# Patient Record
Sex: MALE | Race: WHITE | NOT HISPANIC OR LATINO | Employment: FULL TIME | URBAN - METROPOLITAN AREA
[De-identification: names, ages, dates, MRNs, and addresses within clinical notes are randomized per-mention and may not be internally consistent; named-entity substitution may affect disease eponyms.]

---

## 2018-01-18 ENCOUNTER — COMMUNICATION - HEALTHEAST (OUTPATIENT)
Dept: ADMINISTRATIVE | Facility: CLINIC | Age: 53
End: 2018-01-18

## 2018-03-02 ENCOUNTER — AMBULATORY - HEALTHEAST (OUTPATIENT)
Dept: CARDIOLOGY | Facility: CLINIC | Age: 53
End: 2018-03-02

## 2018-03-02 ENCOUNTER — RECORDS - HEALTHEAST (OUTPATIENT)
Dept: ADMINISTRATIVE | Facility: OTHER | Age: 53
End: 2018-03-02

## 2018-03-06 ENCOUNTER — COMMUNICATION - HEALTHEAST (OUTPATIENT)
Dept: TELEHEALTH | Facility: CLINIC | Age: 53
End: 2018-03-06

## 2018-03-07 ENCOUNTER — OFFICE VISIT - HEALTHEAST (OUTPATIENT)
Dept: CARDIOLOGY | Facility: TELEHEALTH | Age: 53
End: 2018-03-07

## 2018-03-07 DIAGNOSIS — I48.0 PAROXYSMAL ATRIAL FIBRILLATION (H): ICD-10-CM

## 2018-03-07 ASSESSMENT — MIFFLIN-ST. JEOR: SCORE: 1758.44

## 2019-02-13 ENCOUNTER — COMMUNICATION - HEALTHEAST (OUTPATIENT)
Dept: ADMINISTRATIVE | Facility: CLINIC | Age: 54
End: 2019-02-13

## 2019-05-10 ENCOUNTER — RECORDS - HEALTHEAST (OUTPATIENT)
Dept: ADMINISTRATIVE | Facility: OTHER | Age: 54
End: 2019-05-10

## 2019-05-10 ENCOUNTER — AMBULATORY - HEALTHEAST (OUTPATIENT)
Dept: CARDIOLOGY | Facility: CLINIC | Age: 54
End: 2019-05-10

## 2019-05-15 ENCOUNTER — OFFICE VISIT - HEALTHEAST (OUTPATIENT)
Dept: CARDIOLOGY | Facility: TELEHEALTH | Age: 54
End: 2019-05-15

## 2019-05-15 DIAGNOSIS — I48.0 PAROXYSMAL ATRIAL FIBRILLATION (H): ICD-10-CM

## 2019-05-15 ASSESSMENT — MIFFLIN-ST. JEOR: SCORE: 1794.73

## 2021-05-28 NOTE — PROGRESS NOTES
Today we discussed his atrial fibrillation and this year he has had more episodes.  Longest episode being 12 hours.  He sometimes takes an extra metoprolol.  He can navigate fine with atrial fibrillation but is quite annoying.  He continues to take an aspirin a day.    Chest is clear to auscultation cardiovascular exam there is no increased secondary to pressure nor is her hepatojugular reflux.  S1-S2 without murmur clicks or rubs.  No edema.    We talked today about the option of medical therapy versus continued waiting, versus pulmonary vein isolation.  I would favor pulmonary vein isolation procedure as he is in a statistical category that would be most successful.  He would like to give that some consideration will have him visit with our atrial fibrillation clinic in the near future.    Thank you for allowing us to participate in his care.

## 2021-05-31 VITALS — BODY MASS INDEX: 26.14 KG/M2 | HEIGHT: 72 IN | WEIGHT: 193 LBS

## 2021-06-02 VITALS — HEIGHT: 72 IN | BODY MASS INDEX: 27.22 KG/M2 | WEIGHT: 201 LBS

## 2021-06-18 NOTE — LETTER
Letter by Mark Anthony Murray MD at      Author: Mark Anthony Murray MD Service: -- Author Type: --    Filed:  Encounter Date: 2/13/2019 Status: (Other)       Vishal Cat  [address]  [place] [address]      February 13, 2019      Dear Vishal,    This letter is to remind you that you will be due for your follow up appointment with Dr. Mark Anthony Murray  . To help ensure you are in the best health possible, a regular follow-up with your cardiologist is essential.     Please call our Patient Scheduling Line at [phone] to schedule your appointment at your earliest convenience.  If you have recently scheduled an appointment, please disregard this letter.    We look forward to seeing you again. As always, we are available at the number  above for any questions or concerns you may have.      Sincerely,     The Physicians and Staff of [facility] [place] [facility]

## 2021-06-26 NOTE — PROGRESS NOTES
Progress Notes by Mark Anthony Murray MD at 3/7/2018  3:50 PM     Author: Mark Anthony Murray MD Service: -- Author Type: Physician    Filed: 3/7/2018  5:44 PM Encounter Date: 3/7/2018 Status: Signed    : Mark Anthony Murray MD (Physician)           Click to link to [facility] NOTE    Thank you, Dr. Doherty, for asking us to see Vishal Cat at the [facility].      Assessment/Recommendations   Patient with paroxysmal atrial fibrillation which is more frequent.  We talked about various strategies including increasing his beta-blocker.  We talked about antiarrhythmic therapy and we also discussed the possibility of pulmonary vein isolation which typically works better from a statistical standpoint in people who have paroxysmal atrial fibrillation.  I showed him a heart model and where the ablation would take place and the rationale for isolating the pulmonary veins.    He would prefer not to change his medications now.  He does take an aspirin a day and and his chads 2 vascular score is 0.  I did encourage him to seek more information regarding pulmonary vein isolation and we will set him up with an appointment with 1 of our electrophysiologist in the near future.  Would like to hear more about this.    I did warn him that a sleep study would be likely recommended if pulmonary vein isolation procedure was being contemplated and given the timing of his atrial fibrillation episodes would be quite reasonable.  I did not yet order the sleep study but we will see with the electrophysiologist says.    Thank you for allowing us to participate in his care.         History of Present Illness    Mr. Vishal Cat is a 52 y.o. male with a history of paroxysmal atrial fibrillation.  He was first diagnosed with atrial fibrillation and emergency department visit.  Since that time he has had very intermittent atrial fibrillation and when I saw him a year ago it had been quite  infrequent on low-dose beta-blocker.  This past year he has had more episodes.  He has had 4-5 episodes that have lasted more than 3 or 4 hours.  The longest episode lasts 6 hours.  He typically will get them when he awakens in the night at 3 or 4 in the morning but can get them at anytime during the day.  Some episodes can only last 5 minutes.  He feels wiped out when he has these episodes if they are prolonged he really has to rest.  He will sometimes take an extra metoprolol and this seems to help but not always very quickly.  He is asked his spouse about snoring and she denies that he snores.  He is not sleepy during the day.  He does not fall asleep or nap.  He feels rested when he wakes up in the morning.  He is never had a sleep study.    He denies chest discomfort, orthopnea, paroxysmal nocturnal dyspnea or peripheral edema.  He is not syncopal when he has this.  His atrial fibrillation is often accompanied by some GI distress.    .     Physical Examination Review of Systems   Vitals:    03/07/18 1548   BP: 132/72   Pulse: 80   Resp: 16     Body mass index is 26.18 kg/(m^2).  Wt Readings from Last 3 Encounters:   03/07/18 193 lb (87.5 kg)   09/28/16 199 lb (90.3 kg)   09/10/15 197 lb 9.6 oz (89.6 kg)     General Appearance:   Alert, cooperative and in no acute distress.   ENT/Mouth: Oral mucuos membranes pink and moist .      EYES:  No scleral icterus. No Xanthelasma.    Neck: JVP normal. No Hepatojugular reflux. Thyroid not visualized   Chest/Lungs:   Lungs are clear to auscultation, equal chest wall expansion    Cardiovascular:   S1, S2 without murmur ,clicks or rubs. Brachial, radial and posterior tibial pulses are intact and symetric. No carotid bruits noted   Abdomen:  Nontender. BS+.       Extremities: No cyanosis, clubbing or edema   Skin: no xanthelasma, warm.    Psych: Appropriate affect.   Neurologic: normal gait, normal  bilateral, no tremors        General: WNL  Eyes: WNL  Ears/Nose/Throat:  WNL  Lungs: WNL  Heart: Irregular Heartbeat  Stomach: WNL  Bladder: WNL  Muscle/Joints: WNL  Skin: WNL  Nervous System: WNL  Mental Health: WNL     Blood: WNL     Medical History  Surgical History Family History Social History   No past medical history on file. No past surgical history on file. No family history on file. Social History     Social History   ? Marital status:      Spouse name: N/A   ? Number of children: N/A   ? Years of education: N/A     Occupational History   ? Not on file.     Social History Main Topics   ? Smoking status: Current Every Day Smoker   ? Smokeless tobacco: Never Used   ? Alcohol use Not on file   ? Drug use: Not on file   ? Sexual activity: Not on file     Other Topics Concern   ? Not on file     Social History Narrative          Medications  Allergies   Current Outpatient Prescriptions   Medication Sig Dispense Refill   ? aspirin 325 MG tablet Take 325 mg by mouth daily.     ? calcium-vitamin D (CALCIUM-VITAMIN D) 500 mg(1,250mg) -200 unit per tablet Take 1 tablet by mouth daily.     ? Lactobacillus rhamnosus GG (CULTURELLE) 10-15 Billion cell capsule Take 1 capsule by mouth daily.     ? metoprolol tartrate (LOPRESSOR) 50 MG tablet Take 50 mg by mouth 2 (two) times a day.      ? psyllium (METAMUCIL) 3.4 gram packet Take 1 packet by mouth daily.       No current facility-administered medications for this visit.       No Known Allergies      Lab Results    Chemistry/lipid CBC Cardiac Enzymes/BNP/TSH/INR   No results found for: CHOL, HDL, LDLCALC, TRIG, CREATININE, BUN, K, NA, CL, CO2 No results found for: WBC, HGB, HCT, MCV, PLT No results found for: CKTOTAL, CKMB, CKMBINDEX, TROPONINI, BNP, TSH, INR

## 2021-12-13 ENCOUNTER — OFFICE VISIT (OUTPATIENT)
Dept: CARDIOLOGY | Facility: CLINIC | Age: 56
End: 2021-12-13
Payer: COMMERCIAL

## 2021-12-13 VITALS
HEIGHT: 72 IN | DIASTOLIC BLOOD PRESSURE: 74 MMHG | WEIGHT: 209 LBS | HEART RATE: 70 BPM | SYSTOLIC BLOOD PRESSURE: 130 MMHG | BODY MASS INDEX: 28.31 KG/M2 | RESPIRATION RATE: 16 BRPM

## 2021-12-13 DIAGNOSIS — R06.83 SNORING: ICD-10-CM

## 2021-12-13 DIAGNOSIS — I48.0 PAROXYSMAL ATRIAL FIBRILLATION (H): Primary | ICD-10-CM

## 2021-12-13 PROCEDURE — 99215 OFFICE O/P EST HI 40 MIN: CPT | Performed by: INTERNAL MEDICINE

## 2021-12-13 RX ORDER — EPINEPHRINE 0.3 MG/.3ML
INJECTION SUBCUTANEOUS PRN
COMMUNITY
Start: 2021-06-02

## 2021-12-13 ASSESSMENT — MIFFLIN-ST. JEOR: SCORE: 1826.02

## 2021-12-13 NOTE — LETTER
12/13/2021    [facility] [address]  [place] [address]    RE: Vishal Cat       Dear Colleague,     I had the pleasure of seeing Vishal Cat in the [facility].    [facility]  Cardiac Electrophysiology     Consultation Note: Akbar Ocampo MD    Primary Care: Johann Gomez      Thank you, Johann Gomez, for asking the [facility] team to see . Vishal Cat to evaluate treatment options for paroxysmal atrial fibrillation.    Assessment/Recommendations   Assessment:      Paroxysmal atrial fibrillation: The patient's symptoms date back to 2009.  Episodes are relatively infrequent and treated with beta-blocker.  More recently the patient's arrhythmia burden has significantly increased over the past year.  The underlying pathophysiology of the patient's condition as well as treatment options with both medical therapy and ablation were discussed in detail.  The patient is a candidate for mapping and ablation of his arrhythmia.  The patient prefers not to be on long-term medical therapy if possible.  The risks, benefits, expected outcome for ablation therapy were explained and the patient will consider this information further before rendering a decision.    Snoring: The patient does give a history of snoring including waking himself up.  The patient's episodes of atrial fibrillation are most frequently occurring in the early morning hours just as he awakens.  It would be my recommendation that the patient undergo formal sleep testing in order to exclude sleep apnea which may be a significant contributor to recurrence of his atrial fibrillation.    Plan:    Continue with aspirin therapy.    Continue with current beta-blocker therapy    Referral to the sleep clinic for consideration of formal sleep study and treatment if appropriate.    Follow-up in the arrhythmia clinic with the EP nurse practitioner in 8-10 weeks.    The patient be  "contacted by the EP nurse clinician later this week to determine if he wishes to consider moving forward with ablation and if so in what timeframe.                History of Present Illness/Subjective    Mr. Vishal Cat is a 56 year old male with history of symptomatic atrial fibrillation dating back to 2009.  The first episode was associated with severe symptoms and he thought he is having \"heart attack\".  Subsequently the patient has had episodes over the past many years, but in the past 12 months he has had >20 episodes with episodes duration up to 16 hours.  Most frequently these episodes occur (waking up in the morning.  He has a sensation of feeling \"warm\" in his chest with subsequent sensation of abnormal heart beating which is rapid and he feels \"exhausted\".  He has no chest pain or shortness of breath.  He can have some mild orthostatic lightheadedness but has not had any severe presyncope or syncope.  On questioning he notes that he does snore and occasionally awakens himself from the snore.  He has not been formally tested for obstructive sleep apnea.    ECG:   Personally reviewed.  Unable to review previous obtained EKG tracings and cardiac monitor from [email or web address].    ECHO:   Dated: Pending his decision about whether he wishes to move forward with ablation.    Other Studies:       @[email or web address]@       Physical Examination Review of Systems   /74 (BP Location: Left arm, Patient Position: Sitting, Cuff Size: Adult Large)   Pulse 70   Resp 16   Ht 1.829 m (6')   Wt 94.8 kg (209 lb)   BMI 28.35 kg/m    Body mass index is 28.35 kg/m .  Wt Readings from Last 3 Encounters:   12/13/21 94.8 kg (209 lb)   05/15/19 91.2 kg (201 lb)   03/07/18 87.5 kg (193 lb)     [unfilled]    General     Appearance:   The patient is alert oriented to person place and situation.    The patient is in no acute distress at the time of my evaluation.   HEENT:  Pupils are equal, round, and reactive to light.  " Conjunctiva and sclera are clear.  ENT: Oral mucosa is moist and without redness. No evident nasal discharge.   Neck: Without palpable thyroid or appreciable lymph nodes.   Chest: Symmetric, without deformity.   Lungs:   Clear bilaterally with no rales, rhonchi, or wheezes.     Cardiovascular:   Rhythm is regular. S1 and S2 are normal. No significant murmur is present. JVP is normal. Lower extremities demonstrate no significant edema. Distal pulses are intact bilaterally.   Abdomen:  Soft, non-tender, and without hepatosplenomegaly. Bowel sounds present.   Extremities: Without deformity.   Skin: Skin is warm, dry, and otherwise intact.   Neurologic: Gait is normal.           A 12 point comprehensive review of systems was  negative except as noted.      Medical History  Surgical History Family History Social History   Past Medical History:   Diagnosis Date     Snoring 12/13/2021    Sleep study recommended 12/2021    History reviewed. No pertinent surgical history.   No family history of atrial fibrillation. Social History     Socioeconomic History     Marital status:      Spouse name: Not on file     Number of children: Not on file     Years of education: Not on file     Highest education level: Not on file   Occupational History     Not on file   Tobacco Use     Smoking status: Current Every Day Smoker     Packs/day: 1.00     Types: Cigarettes     Smokeless tobacco: Never Used   Substance and Sexual Activity     Alcohol use: Not on file     Drug use: Not on file     Sexual activity: Not on file   Other Topics Concern     Not on file   Social History Narrative     Not on file     Social Determinants of Health     Financial Resource Strain: Not on file   Food Insecurity: Not on file   Transportation Needs: Not on file   Physical Activity: Not on file   Stress: Not on file   Social Connections: Not on file   Intimate Partner Violence: Not on file   Housing Stability: Not on file          Medications  Allergies    Scheduled Meds:  Current Outpatient Medications   Medication Sig Dispense Refill     aspirin 325 MG tablet [ASPIRIN 325 MG TABLET] Take 325 mg by mouth daily.       calcium-vitamin D (CALCIUM-VITAMIN D) 500 mg(1,250mg) -200 unit per tablet [CALCIUM-VITAMIN D (CALCIUM-VITAMIN D) 500 MG(1,250MG) -200 UNIT PER TABLET] Take 1 tablet by mouth daily.       EPINEPHrine (ANY BX GENERIC EQUIV) 0.3 MG/0.3ML injection 2-pack as needed       Lactobacillus rhamnosus GG (CULTURELLE) 10-15 Billion cell capsule [LACTOBACILLUS RHAMNOSUS GG (CULTURELLE) 10-15 BILLION CELL CAPSULE] Take 1 capsule by mouth daily.       metoprolol tartrate (LOPRESSOR) 50 MG tablet [METOPROLOL TARTRATE (LOPRESSOR) 50 MG TABLET] Take 50 mg by mouth 2 (two) times a day.        Omega-3 Fatty Acids (FISH OIL PO) Take 1 capsule by mouth daily       psyllium (METAMUCIL) 3.4 gram packet [PSYLLIUM (METAMUCIL) 3.4 GRAM PACKET] Take 1 packet by mouth daily.      Allergies   Allergen Reactions     Bee Venom          Lab Results    Chemistry/lipid CBC Cardiac Enzymes/BNP/TSH/INR   No results found for: CHOL, HDL, TRIG, CREATININE, BUN, NA, CO2 No results found for: WBC, HGB, HCT, MCV, PLT No results found for: CKTOTAL, CKMB, TROPONINI, BNP, TSH, INR      Akbar Ocampo MD  Clinical Cardiac Electrophysiologist  [facility]      Thank you for allowing me to participate in the care of your patient.      Sincerely,     Akbar Ocampo MD     [facility]

## 2021-12-13 NOTE — Clinical Note
Hi Team, Please see my consult note. Can I ask 1 of you to please reach out to the patient at the end of this week or beginning of next week to see if he wishes to move forward with ablation.  I suspect he will but likely would prefer April or May. If the patient does want to move forward please have him get a transthoracic echo for cardiac function and valve status. I did talk to the patient about either going ahead with me or Dr. Aviles.   Thanks Akbar

## 2021-12-13 NOTE — PROGRESS NOTES
[facility]  Cardiac Electrophysiology     Consultation Note: Akbar Ocampo MD    Primary Care: Dionisio Gomez      Thank you, Dionisio Gomez, for asking the [facility] Care team to see Mr. Vishal Cta to evaluate treatment options for paroxysmal atrial fibrillation.    Assessment/Recommendations   Assessment:      Paroxysmal atrial fibrillation: The patient's symptoms date back to 2009.  Episodes are relatively infrequent and treated with beta-blocker.  More recently the patient's arrhythmia burden has significantly increased over the past year.  The underlying pathophysiology of the patient's condition as well as treatment options with both medical therapy and ablation were discussed in detail.  The patient is a candidate for mapping and ablation of his arrhythmia.  The patient prefers not to be on long-term medical therapy if possible.  The risks, benefits, expected outcome for ablation therapy were explained and the patient will consider this information further before rendering a decision.    Snoring: The patient does give a history of snoring including waking himself up.  The patient's episodes of atrial fibrillation are most frequently occurring in the early morning hours just as he awakens.  It would be my recommendation that the patient undergo formal sleep testing in order to exclude sleep apnea which may be a significant contributor to recurrence of his atrial fibrillation.    Plan:    Continue with aspirin therapy.    Continue with current beta-blocker therapy    Referral to the sleep clinic for consideration of formal sleep study and treatment if appropriate.    Follow-up in the arrhythmia clinic with the EP nurse practitioner in 8-10 weeks.    The patient be contacted by the EP nurse clinician later this week to determine if he wishes to consider moving forward with ablation and if so in what timeframe.                History of Present Illness/Subjective    Mr. Vishal HAGER  "Stephania is a 56 year old male with history of symptomatic atrial fibrillation dating back to 2009.  The first episode was associated with severe symptoms and he thought he is having \"heart attack\".  Subsequently the patient has had episodes over the past many years, but in the past 12 months he has had >20 episodes with episodes duration up to 16 hours.  Most frequently these episodes occur (waking up in the morning.  He has a sensation of feeling \"warm\" in his chest with subsequent sensation of abnormal heart beating which is rapid and he feels \"exhausted\".  He has no chest pain or shortness of breath.  He can have some mild orthostatic lightheadedness but has not had any severe presyncope or syncope.  On questioning he notes that he does snore and occasionally awakens himself from the snore.  He has not been formally tested for obstructive sleep apnea.    ECG:   Personally reviewed.  Unable to review previous obtained EKG tracings and cardiac monitor from [employer].    ECHO:   Dated: Pending his decision about whether he wishes to move forward with ablation.    Other Studies:       @[email or web address]@       Physical Examination Review of Systems   /74 (BP Location: Left arm, Patient Position: Sitting, Cuff Size: Adult Large)   Pulse 70   Resp 16   Ht 1.829 m (6')   Wt 94.8 kg (209 lb)   BMI 28.35 kg/m    Body mass index is 28.35 kg/m .  Wt Readings from Last 3 Encounters:   12/13/21 94.8 kg (209 lb)   05/15/19 91.2 kg (201 lb)   03/07/18 87.5 kg (193 lb)     [unfilled]    General     Appearance:   The patient is alert oriented to person place and situation.    The patient is in no acute distress at the time of my evaluation.   HEENT:  Pupils are equal, round, and reactive to light.  Conjunctiva and sclera are clear.  ENT: Oral mucosa is moist and without redness. No evident nasal discharge.   Neck: Without palpable thyroid or appreciable lymph nodes.   Chest: Symmetric, without deformity. "   Lungs:   Clear bilaterally with no rales, rhonchi, or wheezes.     Cardiovascular:   Rhythm is regular. S1 and S2 are normal. No significant murmur is present. JVP is normal. Lower extremities demonstrate no significant edema. Distal pulses are intact bilaterally.   Abdomen:  Soft, non-tender, and without hepatosplenomegaly. Bowel sounds present.   Extremities: Without deformity.   Skin: Skin is warm, dry, and otherwise intact.   Neurologic: Gait is normal.           A 12 point comprehensive review of systems was  negative except as noted.      Medical History  Surgical History Family History Social History   Past Medical History:   Diagnosis Date     Snoring 12/13/2021    Sleep study recommended 12/2021    History reviewed. No pertinent surgical history.   No family history of atrial fibrillation. Social History     Socioeconomic History     Marital status:      Spouse name: Not on file     Number of children: Not on file     Years of education: Not on file     Highest education level: Not on file   Occupational History     Not on file   Tobacco Use     Smoking status: Current Every Day Smoker     Packs/day: 1.00     Types: Cigarettes     Smokeless tobacco: Never Used   Substance and Sexual Activity     Alcohol use: Not on file     Drug use: Not on file     Sexual activity: Not on file   Other Topics Concern     Not on file   Social History Narrative     Not on file     Social Determinants of Health     Financial Resource Strain: Not on file   Food Insecurity: Not on file   Transportation Needs: Not on file   Physical Activity: Not on file   Stress: Not on file   Social Connections: Not on file   Intimate Partner Violence: Not on file   Housing Stability: Not on file          Medications  Allergies   Scheduled Meds:  Current Outpatient Medications   Medication Sig Dispense Refill     aspirin 325 MG tablet [ASPIRIN 325 MG TABLET] Take 325 mg by mouth daily.       calcium-vitamin D (CALCIUM-VITAMIN D) 500  mg(1,250mg) -200 unit per tablet [CALCIUM-VITAMIN D (CALCIUM-VITAMIN D) 500 MG(1,250MG) -200 UNIT PER TABLET] Take 1 tablet by mouth daily.       EPINEPHrine (ANY BX GENERIC EQUIV) 0.3 MG/0.3ML injection 2-pack as needed       Lactobacillus rhamnosus GG (CULTURELLE) 10-15 Billion cell capsule [LACTOBACILLUS RHAMNOSUS GG (CULTURELLE) 10-15 BILLION CELL CAPSULE] Take 1 capsule by mouth daily.       metoprolol tartrate (LOPRESSOR) 50 MG tablet [METOPROLOL TARTRATE (LOPRESSOR) 50 MG TABLET] Take 50 mg by mouth 2 (two) times a day.        Omega-3 Fatty Acids (FISH OIL PO) Take 1 capsule by mouth daily       psyllium (METAMUCIL) 3.4 gram packet [PSYLLIUM (METAMUCIL) 3.4 GRAM PACKET] Take 1 packet by mouth daily.      Allergies   Allergen Reactions     Bee Venom          Lab Results    Chemistry/lipid CBC Cardiac Enzymes/BNP/TSH/INR   No results found for: CHOL, HDL, TRIG, CREATININE, BUN, NA, CO2 No results found for: WBC, HGB, HCT, MCV, PLT No results found for: CKTOTAL, CKMB, TROPONINI, BNP, TSH, INR      Akbar Ocampo MD  Clinical Cardiac Electrophysiologist  [facility]

## 2021-12-14 ENCOUNTER — TELEPHONE (OUTPATIENT)
Dept: CARDIOLOGY | Facility: CLINIC | Age: 56
End: 2021-12-14
Payer: COMMERCIAL

## 2021-12-14 NOTE — TELEPHONE ENCOUNTER
Noted.  PC note created and postponed to 12/17 to reach out to pt per request by Dr Ocampo.  12/14/2021 8:22 AM  TRACEE Arango Stuart, MD  [facility] Ep Support [facility] Team,  Please see my consult note.  Can I ask 1 of you to please reach out to the patient at the end of this week or beginning of next week to see if he wishes to move forward with ablation.  I suspect he will but likely would prefer April or May.  If the patient does want to move forward please have him get a transthoracic echo for cardiac function and valve status.  I did talk to the patient about either going ahead with me or Dr. Aviles.    Thanks  Akbar

## 2021-12-20 DIAGNOSIS — I48.0 PAROXYSMAL ATRIAL FIBRILLATION (H): Primary | ICD-10-CM

## 2021-12-20 NOTE — TELEPHONE ENCOUNTER
Dr. Ocampo,     Patient was called, agrees to PVI.  Wants the summer of 2022.  Reviewed need for echo, patient agreeable, echo ordered.      Will plan for PVI, general anesthesia.  Start eliquis 1 month prior.  No pre procedure imaging (other than TTE)  No AADs to hold    Patient prefers to have procedure with you.    Anything additional?  Thank you  Kena

## 2021-12-28 ENCOUNTER — DOCUMENTATION ONLY (OUTPATIENT)
Dept: CARDIOLOGY | Facility: CLINIC | Age: 56
End: 2021-12-28
Payer: COMMERCIAL

## 2021-12-28 NOTE — TELEPHONE ENCOUNTER
Akbar Ocampo MD  You 21 hours ago (11:09 AM)     SA Escudero,   Agree with the plan as you outlined.  Akbar Ocampo MD

## 2021-12-28 NOTE — PROGRESS NOTES
H&P []  Date: Teach []  Date: PVI  Clinic N [x]  Y [] DEDE N [x] Y[]  Order [] CT  MRI N [x] Y[]  Order []   PVI  Order Case Req []  Order Set [] Lab/EKG   []  Orders Letter [] F/U RN [] Date:  NP [] Date:     COVID FV/EPIC []  External []  Date: AC Eliquis [x]   Xarelot []  Pradaxa []  Warfarin [] Start [x]  Cont []  Switch [] to:   INR Reminder EP [] & INR Msg to AC team []     AAD         Hold x3 days []  Continue [] PPI Protonix 40mg QD [x] 3 days, x 6wks  Pepcid 20mg BID []  Omeprazole 40mg QD []  Continue current PPI []  Increase [] :        NEEDS TTE, ordered    1965  Home:[phone] (home) Cell:[phone] (mobile)  Emergency Contact: KRISTY COONEY  [phone]  PCP: Dionisio Doherty, [phone]    Important patient information for CSC/Cath Lab staff : None    C EP Cath Lab Procedure Order   Ablation Type:  PVI- Atrial Fibrillation  Diagnosis:  AF  Ordering Provider: Dr Ocampo  Ordering Date: 12/28/2021    Scheduling Information:  Anticipated Case Duration:  1:1 day- limit schedule to allow no further procedures to follow ablation on scheduled date   Scheduling Timeframe:  patient looking for spring/summer  Clinic Arrangements prior to PVI: Schedule pt directly for PVI procedure with designated MD listed below, pt to see EP NP only for H&P and EP RN for education prior  Scheduling Restrictions: Will need to start anticoagulant 4 wks prior- schedule accordingly  Scheduling Contact: Pt is aware of scheduling limitations at this time due to schedule, please call pt when schedule is available  EP RN Follow Up Apt: Schedule telephone visit for post op follow up 3-4 days after abaltion    MD Preference: Dr Ocampo    Current Device: None None  Device Company/Device Rep Needed for Procedure: None    Pre-Procedural Testing needed: COVID 19 nasal/lab test, Echo and BMP, CBC with Plt, and Type and Screen AM of Case  Mapping System Required:  Can use either NILESH (OR 24 only at [facility]) or Carto (Rm 1 [facility], or  [facility])  ICE Needed:  Yes  Special equipment/Staff needed for case: None  Anesthesia:  General-Whole Case    [facility] EP Cath Lab Prep   H&P:  Schedule apt with EP NP to complete within 1 wk of scheduled PVI    Pre-op Labs: Ordered AM of procedure    Medical Records Pertinent for Procedure:  Echo ordered  Important Past Medical Hx/Diagnosis: CHADS VASC 0   Most Recent Lab Results: BMP- Within Acceptable Limits for Procedure Heme- Within Acceptable Limits for Procedure    Patient Education:  Teach with Patient: Teach with pt will be completed same day as pre-op H&P-see additional clinic note    Risks Reviewed:   Pulmonary Vein/AF/Radiofrequency Ablation  In addition to standard risks for Radiofrequency Ablation, there is:    <2% for significant pulmonary vein stenosis    <2% risk for embolic events    <1% risk for esophageal fistula    <1% risk death      Pulmonary Vein Isolation / Cryoablation Risks:    1-2% risk for phrenic nerve paralysis    <1% risk for pulmonary vein stenosis    Risk of esophageal irritation with no incidence of atrial-esophageal fistula    Rare cryoballoon rupture    <1% risk death       Cardiac Catheter Ablation    <1% risk for the following: hypotension, hemorrhage, vascular injury including perforation of vein, artery or heart, thrombophlebitis, systemic or pulmonic emboli; cardiac perforation, (tamponade), infection, pneumothorax, arrhythmias, proarrhythmic effects of drugs, radiation exposure.    1-2% complete heart block (for AVNRT or septol accessory pathway).    <0.5% CVA or MI    <0.1% death    If external defibrillation is needed, 75% risk for superficial burn.    1-2% tamponade and aortic puncture with left sided transeptal approach for left side NILESH - increase risk of CVA to <2%.    Late arrhythmia recurrences depends upon the primary rhythm disturbance.      Pre-Procedure Instructions:  NPO after midnight, Remove all jewelry prior to coming in for procedure, Shower prior to arrival,  Notified patient of time and date of procedure by CV , Transportation arrangements needed s/p procedure, Post-procedure follow up process, Sedation plan/orders and Pre-procedure letter was sent to pt    Pre-Procedure Medication Instructions:  Instructions given to pt regarding anticoagulants: Pt will be started on anticoagulant 1mo prior to PVI- instructed to continue anticoagulation uninterrupted through their procedure  Instructions given to pt regarding antiarrhythmic medication: Beta Blocker; Pt instructed to continue medication prior to procedure  Instructions given to pt regarding PPI medication:Start Protonix 40mg Daily 3 days prior, and will continue 6 wks s/p PVI  Instructions for medication, other than anticoagulants and antiarrhythmics listed above, given to pt: to hold all morning medications   Allergies: Reviewed allergies, no concerns regarding orders for procedure  Allergies   Allergen Reactions     Bee Venom        Current Outpatient Medications:      aspirin 325 MG tablet, [ASPIRIN 325 MG TABLET] Take 325 mg by mouth daily., Disp: , Rfl:      calcium-vitamin D (CALCIUM-VITAMIN D) 500 mg(1,250mg) -200 unit per tablet, [CALCIUM-VITAMIN D (CALCIUM-VITAMIN D) 500 MG(1,250MG) -200 UNIT PER TABLET] Take 1 tablet by mouth daily., Disp: , Rfl:      EPINEPHrine (ANY BX GENERIC EQUIV) 0.3 MG/0.3ML injection 2-pack, as needed, Disp: , Rfl:      Lactobacillus rhamnosus GG (CULTURELLE) 10-15 Billion cell capsule, [LACTOBACILLUS RHAMNOSUS GG (CULTURELLE) 10-15 BILLION CELL CAPSULE] Take 1 capsule by mouth daily., Disp: , Rfl:      metoprolol tartrate (LOPRESSOR) 50 MG tablet, [METOPROLOL TARTRATE (LOPRESSOR) 50 MG TABLET] Take 50 mg by mouth 2 (two) times a day. , Disp: , Rfl:      Omega-3 Fatty Acids (FISH OIL PO), Take 1 capsule by mouth daily, Disp: , Rfl:      psyllium (METAMUCIL) 3.4 gram packet, [PSYLLIUM (METAMUCIL) 3.4 GRAM PACKET] Take 1 packet by mouth daily., Disp: , Rfl:     Documentation  Date:12/28/2021 8:07 AM  Nevaeh Shea RN

## 2022-02-22 ENCOUNTER — TELEPHONE (OUTPATIENT)
Dept: CARDIOLOGY | Facility: CLINIC | Age: 57
End: 2022-02-22
Payer: COMMERCIAL

## 2022-02-22 NOTE — TELEPHONE ENCOUNTER
Phone call from patient stating he was sent home after an attempt at an Echo on 1-20 due to high heart rates, he wonders if it is ok to reschedule?    Pt states that he is having an increase in afib but is not noting fast heart rates.  He would like to reschedule.  Explained that he can reschedule at any time as he is doing well.  He states understanding, suggested a follow-up with an EP NP if he does note continuing high heart rates.  He states understanding, will ask our scheduling team to call him.

## 2022-02-28 ENCOUNTER — HOSPITAL ENCOUNTER (OUTPATIENT)
Dept: CARDIOLOGY | Facility: CLINIC | Age: 57
Discharge: HOME | End: 2022-02-28
Attending: INTERNAL MEDICINE | Admitting: INTERNAL MEDICINE
Payer: COMMERCIAL

## 2022-02-28 PROCEDURE — 93306 TTE W/DOPPLER COMPLETE: CPT

## 2022-02-28 PROCEDURE — 93306 TTE W/DOPPLER COMPLETE: CPT | Mod: 26 | Performed by: INTERNAL MEDICINE

## 2022-03-02 DIAGNOSIS — I48.92 ATRIAL FLUTTER (H): Primary | ICD-10-CM

## 2022-03-04 ENCOUNTER — HOSPITAL ENCOUNTER (OUTPATIENT)
Dept: CARDIOLOGY | Facility: CLINIC | Age: 57
Discharge: HOME | End: 2022-03-04
Attending: INTERNAL MEDICINE | Admitting: INTERNAL MEDICINE
Payer: COMMERCIAL

## 2022-03-04 DIAGNOSIS — I48.92 ATRIAL FLUTTER (H): ICD-10-CM

## 2022-03-04 PROCEDURE — 93226 XTRNL ECG REC<48 HR SCAN A/R: CPT

## 2022-03-09 ENCOUNTER — OFFICE VISIT (OUTPATIENT)
Dept: SLEEP MEDICINE | Facility: CLINIC | Age: 57
End: 2022-03-09
Attending: INTERNAL MEDICINE
Payer: COMMERCIAL

## 2022-03-09 VITALS
BODY MASS INDEX: 27.5 KG/M2 | DIASTOLIC BLOOD PRESSURE: 79 MMHG | HEART RATE: 72 BPM | SYSTOLIC BLOOD PRESSURE: 117 MMHG | WEIGHT: 203 LBS | HEIGHT: 72 IN | OXYGEN SATURATION: 96 %

## 2022-03-09 DIAGNOSIS — I48.0 PAROXYSMAL ATRIAL FIBRILLATION (H): Primary | ICD-10-CM

## 2022-03-09 DIAGNOSIS — R06.83 SNORING: ICD-10-CM

## 2022-03-09 DIAGNOSIS — F51.04 CHRONIC INSOMNIA: ICD-10-CM

## 2022-03-09 PROCEDURE — 99205 OFFICE O/P NEW HI 60 MIN: CPT | Performed by: PHYSICIAN ASSISTANT

## 2022-03-09 PROCEDURE — 93227 XTRNL ECG REC<48 HR R&I: CPT | Performed by: INTERNAL MEDICINE

## 2022-03-09 NOTE — PATIENT INSTRUCTIONS
General recommendations for sleep problems (Insomnia)  Allow 2-4 weeks to see results     Establish a regular sleep schedule    Most people only need 7-8 hours of sleep.  Don't be in bed longer than you need     to sleep or you will end up spending more time awake in bed. This trains your    brain to think of the bed as a place to not sleep.  Go to bed at same time each night   Get up at same time each day - Set an alarm everyday (even weekends). This is one of    the most important tips. It prevents you from relying on your insomnia to get you    up on time for your day. That actually reinforces insomnia. It also will help your    body get into a pattern where you start feeling tired at a consistent time each    night.  The body functions best when you keep a consistent routine.  Avoid sleeping-in and napping. Anytime you sleep during the day, you will be less tired at    night. You may be tired enough to fall asleep, but you will wake more in the    middle of the night because you will have met your sleep need before the night is    done.   Cut down time in bed (if not asleep, get up)- Use your bed only for sleep and sex    Anytime you spend time in bed doing activities other than sleep (reading,    watching TV, working, playing on the computer or phone, or even just laying in    bed trying to sleep), you are training  your brain to think of the bed as a place to    do activities other than sleep. If you are not falling asleep within 20-30 minutes,    get out of bed. While out of bed, avoid bright lights. Avoid work or chores. Being    productive in the middle of the night reinforces waking up at night. Find relaxing,    not particularly entertaining activities like reading, listening to music, or relaxation    exercises. Go back to bed if you start feeling groggy, or after about 30 minutes,    even if not feeling very tired. Sometimes, just getting out of bed stops the pattern    of getting frustrated about  laying in bed not sleeping, and that can help you fall    asleep.   Avoid trying to force yourself to sleep- sleep is not like everything else. The harder you    work at most things, the more you can accomplish. The harder you work at    sleep, the less you will sleep.     Make the bedroom comfortable - quiet, dark and cool are better. Consider ear plugs    (silicon). Use dark blinds or wear an eye mask if needed     Make a relaxing routine prior to bedtime  Relaxation exercises:   Progressive muscle relaxation: Relax each muscle group individually    Begin with your feet, flex, then relax. Try to imagine your feet feeling heavy and sinking into the bed. Move to your calves, do the same thing. Work through each muscle group toward your head.    Relaxing Mental Imagery: Try to imagine a trip that you took and found relaxing, or imagine a day at the beach. Try to walk yourself through the day in your mind as if you were dreaming it. Try to imagine sensing the different experiences, such as feeling sand between your toes, the heat of the sun on your skin, seeing the waves crashing the shore, the smell of the salt water, etc.     Deal with your worries before bedtime    Set aside a worry time around dinner time for 10-15 minutes. Write down the    things that are on your mind. Plan time in the coming days to address those    issues. Brainstorm ideas on how you will deal with them. Try to identify issues    that are out of your control, and try to let those issues go.  Listen to relaxation tapes   Classical Music or Nature sounds   Back Massage   Get regular exercise each day (at least 1-2 hours before bedtime)   Take medications only as directed   Eat a light bedtime snack or warm drink   Warm milk   Warm herbal tea (non-caffeinated)       Things to avoid   No overstimulating activities just before bed   No competitive games before bedtime   No exciting television programs before bedtime   Avoid caffeine after lunchtime    Avoid chocolate   Do not use alcohol to induce sleep (worsens Insomnia)   Do not take someone else's sleeping pills   Do not look at the clock when awakening   Do not turn on light when getting up to use bathroom, use a nightlight     Online Programs     www.SHUTi.me (pronounced shut eye). There is a fee for this program. Enter the code  [facility]  if you decide to enroll in this program.      www.sleepIO.com (pronounced sleep ee oh). There is a fee for this program. Enter the code  [facility] [place]  if you decide to enroll in this program.     Suggested Resources  Insomnia Treatment Books     Overcoming Insomnia by Raudel Mason and Corinna Foster (2008)    No More Sleepless Nights by Mainor Block and Kemi Eastman (1996)    Say Keenan to Insomnia by Griffin Evans (2009)    The Insomnia Workbook by Jennie Fulton and Efrain Paulson (2009)    The Insomnia Answer by Hernando Decker and Domo Miller (2006)      Stress Management and Relaxation Books    The Relaxation and Stress Reduction Workbook by Jessica Ramos, Arleen Anderson and Krzysztof Whittington (2008)    Stress Management Workbook: Techniques and Self-Assessment Procedures by Amrita Metzger and Reed Wong (1997)    A Mindfulness-Based Stress Reduction Workbook by Fidel Sherman and Joseline Barnes (2010)    The Complete Stress Management Workbook by Mervin Fajardo, Trevon Barajas and Richard Camejo (1996)    Assert Yourself by Ольга Rojas and Kb Rojas (1977)    Relaxation Resources for Computer Download   These websites offer resources to help you relax. This list is for information only. [facility] is not responsible for the quality of services or the actions of any person or organization.  Progressive Muscle Relaxation (PMR):     http://www.innerGather Appstudio.com/progressive-muscle-relaxation-exercise.html     http://studentsupport.[facility]/counseling/resources/self-help/relaxation-and-stress-management/   Deep Breathing  "Exercises:    http://www.Therosteon/breathing-awareness.html     Meditation:     www.HW    www.[employer] ManagementguidedNano Game Studiomeditation-site."[employer]" You may have to pay for some of these resources.    Guided Imagery:    http://www.Therosteon/guided-imagery-scripts.html     http://[employer] V[employer]/jvvhtdlimd-lpoifo-tgeqjfl/   Consider phone apps such as: Calm, Headspace or Insight Timer.    Counseling / Behavioral Health  [facility]  Visit [email or web address] or call [phone] to find a clinic close to you.  Or call [phone] for [facility].          MY TREATMENT INFORMATION FOR SLEEP APNEA-  Vishal MADAN Cat    DOCTOR : Bennett Ezra Goltz, PA-C, [facility]    Am I having a sleep study at a sleep center?  --->Due to normal delays, you will be contacted within 2-4 weeks to schedule    Am I having a home sleep study?  --->Watch the video for the device you are using:    -/drop off device-   https://www.Amarinube.com/watch?v=yGGFBdELGhk    -Disposable device sent out require phone/computer application-   https://www.Amarinube.com/watch?v=BCce_vbiwxE      Frequently asked questions:  1. What is Obstructive Sleep Apnea (JANET)? JANET is the most common type of sleep apnea. Apnea means, \"without breath.\"  Apnea is most often caused by narrowing or collapse of the upper airway as muscles relax during sleep.   Almost everyone has occasional apneas. Most people with sleep apnea have had brief interruptions at night frequently for many years.  The severity of sleep apnea is related to how frequent and severe the events are.   2. What are the consequences of JANET? Symptoms include: feeling sleepy during the day, snoring loudly, gasping or stopping of breathing, trouble sleeping, and occasionally morning headaches or heartburn at night.  Sleepiness can be serious and even increase the risk of falling asleep while driving. Other health consequences may include " development of high blood pressure and other cardiovascular disease in persons who are susceptible. Untreated JANET  can contribute to heart disease, stroke and diabetes.   3. What are the treatment options? In most situations, sleep apnea is a lifelong disease that must be managed with daily therapy. Medications are not effective for sleep apnea and surgery is generally not considered until other therapies have been tried. Your treatment is your choice . Continuous Positive Airway (CPAP) works right away and is the therapy that is effective in nearly everyone. An oral device to hold your jaw forward is usually the next most reliable option. Other options include postioning devices (to keep you off your back), weight loss, and surgery including a tongue pacing device. There is more detail about some of these options below.  4. Are my sleep studies covered by insurance? Although we will request verification of coverage, we advise you also check in advance of the study to ensure there is coverage.    Important tips for those choosing CPAP and similar devices   Know your equipment:  CPAP is continuous positive airway pressure that prevents obstructive sleep apnea by keeping the throat from collapsing while you are sleeping. In most cases, the device is  smart  and can slowly self-adjusts if your throat collapses and keeps a record every day of how well you are treated-this information is available to you and your care team.  BPAP is bilevel positive airway pressure that keeps your throat open and also assists each breath with a pressure boost to maintain adequate breathing.  Special kinds of BPAP are used in patients who have inadequate breathing from lung or heart disease. In most cases, the device is  smart  and can slowly self-adjusts to assist breathing. Like CPAP, the device keeps a record of how well you are treated.  Your mask is your connection to the device. You get to choose what feels most comfortable and the  staff will help to make sure if fits. Here: are some examples of the different masks that are available:       Key points to remember on your journey with sleep apnea:  1. Sleep study.  PAP devices often need to be adjusted during a sleep study to show that they are effective and adjusted right.  2. Good tips to remember: Try wearing just the mask during a quiet time during the day so your body adapts to wearing it. A humidifier is recommended for comfort in most cases to prevent drying of your nose and throat. Allergy medication from your provider may help you if you are having nasal congestion.  3. Getting settled-in. It takes more than one night for most of us to get used to wearing a mask. Try wearing just the mask during a quiet time during the day so your body adapts to wearing it. A humidifier is recommended for comfort in most cases. Our team will work with you carefully on the first day and will be in contact within 4 days and again at 2 and 4 weeks for advice and remote device adjustments. Your therapy is evaluated by the device each day.   4. Use it every night. The more you are able to sleep naturally for 7-8 hours, the more likely you will have good sleep and to prevent health risks or symptoms from sleep apnea. Even if you use it 4 hours it helps. Occasionally all of us are unable to use a medical therapy, in sleep apnea, it is not dangerous to miss one night.   5. Communicate. Call our skilled team on the number provided on the first day if your visit for problems that make it difficult to wear the device. Over 2 out of 3 patients can learn to wear the device long-term with help from our team. Remember to call our team or your sleep providers if you are unable to wear the device as we may have other solutions for those who cannot adapt to mask CPAP therapy. It is recommended that you sleep your sleep provider within the first 3 months and yearly after that if you are not having problems.   6. Use it  for your health. We encourage use of CPAP masks during daytime quiet periods to allow your face and brain to adapt to the sensation of CPAP so that it will be a more natural sensation to awaken to at night or during naps. This can be very useful during the first few weeks or months of adapting to CPAP though it does not help medically to wear CPAP during wakefulness and  should not be used as a strategy just to meet guidelines.  7. Take care of your equipment. Make sure you clean your mask and tubing using directions every day and that your filter and mask are replaced as recommended or if they are not working.     BESIDES CPAP, WHAT OTHER THERAPIES ARE THERE?    Positioning Device  Positioning devices are generally used when sleep apnea is mild and only occurs on your back.This example shows a pillow that straps around the waist. It may be appropriate for those whose sleep study shows milder sleep apnea that occurs primarily when lying flat on one's back. Preliminary studies have shown benefit but effectiveness at home may need to be verified by a home sleep test. These devices are generally not covered by medical insurance.  Examples of devices that maintain sleeping on the back to prevent snoring and mild sleep apnea.    Belt type body positioner  http://GroupSpaces/    Electronic reminder  http://nightshifttherapy.com/  http://www.Wellpartner.AccessData.au/      Oral Appliance  What is oral appliance therapy?  An oral appliance device fits on your teeth at night like a retainer used after having braces. The device is made by a specialized dentist and requires several visits over 1-2 months before a manufactured device is made to fit your teeth and is adjusted to prevent your sleep apnea. Once an oral device is working properly, snoring should be improved. A home sleep test may be recommended at that time if to determine whether the sleep apnea is adequately treated.       Some things to remember:  -Oral devices are often,  but not always, covered by your medical insurance. Be sure to check with your insurance provider.   -If you are referred for oral therapy, you will be given a list of specialized dentists to consider or you may choose to visit the Web site of the American Academy of Dental Sleep Medicine  -Oral devices are less likely to work if you have severe sleep apnea or are extremely overweight.     More detailed information  An oral appliance is a small acrylic device that fits over the upper and lower teeth  (similar to a retainer or a mouth guard). This device slightly moves jaw forward, which moves the base of the tongue forward, opens the airway, improves breathing for effective treat snoring and obstructive sleep apnea in perhaps 7 out of 10 people .  The best working devices are custom-made by a dental device  after a mold is made of the teeth 1, 2, 3.  When is an oral appliance indicated?  Oral appliance therapy is recommended as a first-line treatment for patients with primary snoring, mild sleep apnea, and for patients with moderate sleep apnea who prefer appliance therapy to use of CPAP4, 5. Severity of sleep apnea is determined by sleep testing and is based on the number of respiratory events per hour of sleep.   How successful is oral appliance therapy?  The success rate of oral appliance therapy in patients with mild sleep apnea is 75-80% while in patients with moderate sleep apnea it is 50-70%. The chance of success in patients with severe sleep apnea is 40-50%. The research also shows that oral appliances have a beneficial effect on the cardiovascular health of JAENT patients at the same magnitude as CPAP therapy7.  Oral appliances should be a second-line treatment in cases of severe sleep apnea, but if not completely successful then a combination therapy utilizing CPAP plus oral appliance therapy may be effective. Oral appliances tend to be effective in a broad range of patients although studies show  that the patients who have the highest success are females, younger patients, those with milder disease, and less severe obesity. 3, 6.   Finding a dentist that practices dental sleep medicine  Specific training is available through the American Academy of Dental Sleep Medicine for dentists interested in working in the field of sleep. To find a dentist who is educated in the field of sleep and the use of oral appliances, near you, visit the Web site of the American Academy of Dental Sleep Medicine.    References  1. Silvana, et al. Objectively measured vs self-reported compliance during oral appliance therapy for sleep-disordered breathing. Chest 2013; 144(5): 6975-1710.  2. Eliseo, et al. Objective measurement of compliance during oral appliance therapy for sleep-disordered breathing. Thorax 2013; 68(1): 91-96.  3. Earl et al. Mandibular advancement devices in 620 men and women with JANET and snoring: tolerability and predictors of treatment success. Chest 2004; 125: 9967-5965.  4. Antonio, et al. Oral appliances for snoring and JANET: a review. Sleep 2006; 29: 244-262.  5. Natali et al. Oral appliance treatment for JANET: an update. J Clin Sleep Med 2014; 10(2): 215-227.  6. Nathalia et al. Predictors of OSAH treatment outcome. J Dent Res 2007; 86: 9304-4730.      Weight Loss:    Weight loss is a long-term strategy that may improve sleep apnea in some patients.    Weight management is a personal decision and the decision should be based on your interest and the potential benefits.  If you are interested in exploring weight loss strategies, the following discussion covers the impact on weight loss on sleep apnea and the approaches that may be successful.    Being overweight does not necessarily mean you will have health consequences.  Those who have BMI over 35 or over 27 with existing medical conditions carries greater risk.   Weight loss decreases severity of sleep apnea in most people with obesity.  For those with mild obesity who have developed snoring with weight gain, even 15-30 pound weight loss can improve and occasionally eliminate sleep apnea.  Structured and life-long dietary and health habits are necessary to lose weight and keep healthier weight levels.     Though there may be significant health benefits from weight loss, long-term weight loss is very difficult to achieve- studies show success with dietary management in less than 10% of people. In addition, substantial weight loss may require years of dietary control and may be difficult if patients have severe obesity. In these cases, surgical management may be considered.  Finally, older individuals who have tolerated obesity without health complications may be less likely to benefit from weight loss strategies.        Your BMI is Body mass index is 27.53 kg/m .  Weight management is a personal decision.  If you are interested in exploring weight loss strategies, the following discussion covers the approaches that may be successful. Body mass index (BMI) is one way to tell whether you are at a healthy weight, overweight, or obese. It measures your weight in relation to your height.  A BMI of 18.5 to 24.9 is in the healthy range. A person with a BMI of 25 to 29.9 is considered overweight, and someone with a BMI of 30 or greater is considered obese. More than two-thirds of American adults are considered overweight or obese.  Being overweight or obese increases the risk for further weight gain. Excess weight may lead to heart disease and diabetes.  Creating and following plans for healthy eating and physical activity may help you improve your health.  Weight control is part of healthy lifestyle and includes exercise, emotional health, and healthy eating habits. Careful eating habits lifelong are the mainstay of weight control. Though there are significant health benefits from weight loss, long-term weight loss with diet alone may be very difficult to  achieve- studies show long-term success with dietary management in less than 10% of people. Attaining a healthy weight may be especially difficult to achieve in those with severe obesity. In some cases, medications, devices and surgical management might be considered.  What can you do?  If you are overweight or obese and are interested in methods for weight loss, you should discuss this with your provider.     Consider reducing daily calorie intake by 500 calories.     Keep a food journal.     Avoiding skipping meals, consider cutting portions instead.    Diet combined with exercise helps maintain muscle while optimizing fat loss. Strength training is particularly important for building and maintaining muscle mass. Exercise helps reduce stress, increase energy, and improves fitness. Increasing exercise without diet control, however, may not burn enough calories to loose weight.       Start walking three days a week 10-20 minutes at a time    Work towards walking thirty minutes five days a week     Eventually, increase the speed of your walking for 1-2 minutes at time    And look into health and wellness programs that may be available through your health insurance provider, employer, local community center, or [employer].    Weight management plan: Patient was referred to their PCP to discuss a diet and exercise plan.    Surgery:    Surgery for obstructive sleep apnea is considered generally only when other therapies fail to work. Surgery may be discussed with you if you are having a difficult time tolerating CPAP and or when there is an abnormal structure that requires surgical correction.  Nose and throat surgeries often enlarge the airway to prevent collapse.  Most of these surgeries create pain for 1-2 weeks and up to half of the most common surgeries are not effective throughout life.  You should carefully discuss the benefits and drawbacks to surgery with your sleep provider and surgeon to determine if it is  the best solution for you.   More information  Surgery for JANET is directed at areas that are responsible for narrowing or complete obstruction of the airway during sleep.  There are a wide range of procedures available to enlarge and/or stabilize the airway to prevent blockage of breathing in the three major areas where it can occur: the palate, tongue, and nasal regions.  Successful surgical treatment depends on the accurate identification of the factors responsible for obstructive sleep apnea in each person.  A personalized approach is required because there is no single treatment that works well for everyone.  Because of anatomic variation, consultation with an examination by a sleep surgeon is a critical first step in determining what surgical options are best for each patient.  In some cases, examination during sedation may be recommended in order to guide the selection of procedures.  Patients will be counseled about risks and benefits as well as the typical recovery course after surgery. Surgery is typically not a cure for a person s JANTE.  However, surgery will often significantly improve one s JANET severity (termed  success rate ).  Even in the absence of a cure, surgery will decrease the cardiovascular risk associated with OSA7; improve overall quality of life8 (sleepiness, functionality, sleep quality, etc).  Palate Procedures:  Patients with JANET often have narrowing of their airway in the region of their tonsils and uvula.  The goals of palate procedures are to widen the airway in this region as well as to help the tissues resist collapse.  Modern palate procedure techniques focus on tissue conservation and soft tissue rearrangement, rather than tissue removal.  Often the uvula is preserved in this procedure. Residual sleep apnea is common in patient after pharyngoplasty with an average reduction in sleep apnea events of 33%2.    Tongue Procedures:  ExamWhile patients are awake, the muscles that surround  the throat are active and keep this region open for breathing. These muscles relax during sleep, allowing the tongue and other structures to collapse and block breathing.  There are several different tongue procedures available.  Selection of a tongue base procedure depends on characteristics seen on physical exam.  Generally, procedures are aimed at removing bulky tissues in this area or preventing the back of the tongue from falling back during sleep.  Success rates for tongue surgery range from 50-62%3.  Hypoglossal Nerve Stimulation:  Hypoglossal nerve stimulation has recently received approval from the [place] Food and Drug Administration for the treatment of obstructive sleep apnea.  This is based on research showing that the system was safe and effective in treating sleep apnea6.  Results showed that the median AHI score decreased 68%, from 29.3 to 9.0. This therapy uses an implant system that senses breathing patterns and delivers mild stimulation to airway muscles, which keeps the airway open during sleep.  The system consists of three fully implanted components: a small generator (similar in size to a pacemaker), a breathing sensor, and a stimulation lead.  Using a small handheld remote, a patient turns the therapy on before bed and off upon awakening.    Candidates for this device must be greater than 22 years of age, have moderate to severe JANET (AHI between 20-65), BMI less than 32, have tried CPAP/oral appliance without success, and have appropriate upper airway anatomy (determined by a sleep endoscopy performed by Dr. Davis).  Hypoglossal Nerve Stimulation Pathway:    The sleep surgeon s office will work with the patient through the insurance prior-authorization process (including communications and appeals).    Nasal Procedures:  Nasal obstruction can interfere with nasal breathing during the day and night.  Studies have shown that relief of nasal obstruction can improve the ability of some  patients to tolerate positive airway pressure therapy for obstructive sleep apnea1.  Treatment options include medications such as nasal saline, topical corticosteroid and antihistamine sprays, and oral medications such as antihistamines or decongestants. Non-surgical treatments can include external nasal dilators for selected patients. If these are not successful by themselves, surgery can improve the nasal airway either alone or in combination with these other options.  Combination Procedures:  Combination of surgical procedures and other treatments may be recommended, particularly if patients have more than one area of narrowing or persistent positional disease.  The success rate of combination surgery ranges from 66-80%2,3.    References  1. Cora SALOMON. The Role of the Nose in Snoring and Obstructive Sleep Apnoea: An Update.  Eur Arch Otorhinolaryngol. 2011; 268: 1365-73.  2.  Sofia SM; Darlene JA; Justin JR; Pallanch JF; Danette MB; Charu SG; Yumiko MCKENZIE. Surgical modifications of the upper airway for obstructive sleep apnea in adults: a systematic review and meta-analysis. SLEEP 2010;33(10):6186-3894. Diane STRICKLAND. Hypopharyngeal surgery in obstructive sleep apnea: an evidence-based medicine review.  Arch Otolaryngol Head Neck Surg. 2006 Feb;132(2):206-13.  3. Uday YH1, Magali Y, Andres DAKOTA. The efficacy of anatomically based multilevel surgery for obstructive sleep apnea. Otolaryngol Head Neck Surg. 2003 Oct;129(4):327-35.  4. Diane STRICKLAND, Goldberg A. Hypopharyngeal Surgery in Obstructive Sleep Apnea: An Evidence-Based Medicine Review. Arch Otolaryngol Head Neck Surg. 2006 Feb;132(2):206-13.  5. Jerald PJ et al. Upper-Airway Stimulation for Obstructive Sleep Apnea.  N Engl J Med. 2014 Jan 9;370(2):139-49.  6. Lisa Y et al. Increased Incidence of Cardiovascular Disease in Middle-aged Men with Obstructive Sleep Apnea. Am J Respir Crit Care Med; 2002 166: 159-165  7. Cat POTTS et al. Studying Life Effects and  Effectiveness of Palatopharyngoplasty (SLEEP) study: Subjective Outcomes of Isolated Uvulopalatopharyngoplasty. Otolaryngol Head Neck Surg. 2011; 144: 623-631.        WHAT IF I ONLY HAVE SNORING?      Mandibular advancement devices, lateral sleep positioning, long-term weight loss and treatment of nasal allergies have been shown to improve snoring.    Exercising tongue muscles with a game (https://www.ncbi.nlm.nih.gov/pubmed/65454249) or stimulating the tongue during the day with a device (https://doi.org/10.3390/vnm34377708) have improved snoring in some individuals.    Remember to Drive Safe... Drive Alive     Sleep health profoundly affects your health, mood, and your safety.  Thirty three percent of the population (one in three of us) is not getting enough sleep and many have a sleep disorder. Not getting enough sleep or having an untreated / undertreated sleep condition may make us sleepy without even knowing it. In fact, our driving could be dramatically impaired due to our sleep health. As your provider, here are some things I would like you to know about driving:     Here are some warning signs for impairment and dangerous drowsy driving:              -Having been awake more than 16 hours               -Looking tired               -Eyelid drooping              -Head nodding (it could be too late at this point)              -Driving for more than 30 minutes     Some things you could do to make the driving safer if you are experiencing some drowsiness:              -Stop driving and rest              -Call for transportation              -Make sure your sleep disorder is adequately treated     Some things that have been shown NOT to work when experiencing drowsiness while driving:              -Turning on the radio              -Opening windows              -Eating any  distracting  /  entertaining  foods (e.g., sunflower seeds, candy, or any other)              -Talking on the phone      Your decision may not  only impact your life, but also the life of others. Please, remember to drive safe for yourself and all of us.

## 2022-03-09 NOTE — PROGRESS NOTES
Outpatient Sleep Medicine Consultation:  March 8, 2022    Name: Vishal Cat MRN# [id number]   Age: 56 year old YOB: 1965     Date of Consultation: March 8, 2022  Consultation is requested by: Akbar Ocampo MD  [address] [place] [address] Akbar Ocampo  Primary care provider: Dionisio Doherty       Reason for Sleep Consult:     Vishal Cat is sent by Akbar Ocampo for a sleep consultation regarding snoring and atrial fibrillation.      Patient s Reason for visit: requested by Dr. Terrence Cat main reason for visit:  A-fib, rule out apnea, no concerns about his sleep  Patient states problem(s) started:  A-fib diagnosed 13-14 years ago  Vishal Cat's goals for this visit:  Rule out apnea           Assessment and Plan:     Summary Sleep Diagnoses and Recommendations:  (I48.0) Paroxysmal atrial fibrillation (H)  (primary encounter diagnosis), (R06.83) Snoring  Comment: Vishal presents for evaluation of sleep apnea in the setting of atrial fibrillation. Initially, he denied significant concerns about his sleep. Upon further questioning, he admits that he has been told that he snores when he has excessive alcohol, more than his normal 3 drinks nightly. He has also woken himself with a gasp on the rare occasion that he ends up sleeping on his back.  His STOP BANG is 4; snoring, age >50 (56), neck >40 cm (43 cm), male gender. He denies significant sleepiness, ESS 3/24. He denies observed apnea. He does not have HTN. He has been having more frequent episodes of A-fib. His LVEF was 65-70% recently. He is on metoprolol 50 mg twice daily but his rate was elevated in clinic today (156 bpm).  Plan: HST-Home Sleep Apnea Test        Home study to assess for JANET/central apnea. He has a visit to review Holter monitor tomorrow. He will ask about adjustments to his rate control then. We reviewed the association of apnea and alcohol. He was reminded the recommended amount for men is 2 per  "day.    (F51.04) Chronic insomnia  Comment: He often wakes around 3-4 AM and will watch TV for up to 90 minutes. His sleep schedule is mildly excessive, 10 PM to 7 AM. He is sleeping in more since working from home and traveling less with COVID. He used to sleep 10 PM to 5 AM. He does not nap. He has more work stress.  Plan: We talked about limiting time in bed to 7-8 hours. We also talked about setting aside \"worry time\" earlier in the evening to help reduce racing mind at bedtime.        Comorbid Diagnoses:  Paroxysmal Atrial fibrillation      Summary Counseling:    Sleep Testing Reviewed  Obstructive and Central Sleep Apnea Reviewed  Complications of Untreated Sleep Apnea Reviewed      Medical Decision-making:   Educational materials provided in instructions    Total time spent reviewing medical records, history and physical examination, review of previous testing and interpretation as well as documentation on this date: 80 min    CC: Akbar Ocampo          History of Present Illness:   Per his recent cardiology visit, \"The patient does give a history of snoring including waking himself up.  The patient's episodes of atrial fibrillation are most frequently occurring in the early morning hours just as he awakens.\"  He does not feel he has any problems with his sleep. He says he does not snore normally. His wife will tell him he snores if he drinks too much, unsure what constitutes \"too much,.\" maybe over 6 drinks. He normally has a few drinks per night. He has rarely woken with a gasp if on his back. He normally sleeps laterally. He has not had observed gasps, snorts or pauses.   Past Sleep Evaluations: none    SLEEP-WAKE SCHEDULE:     Work/School Days: Patient goes to school/work:     Usually gets into bed at  10 PM  Takes patient about 10-20 min  to fall asleep  Has trouble falling asleep 0  nights per week  Wakes up in the middle of the night 1-2  times due to restroom or feeling uncomfortable (arm falls " asleep)  He often wakes around 3-4 AM for uncertain reasons. He often feels better then compared to when he wakes if he goes back to sleep and wakes at 7 AM. He has trouble falling back asleep most nights. He will watch TV for up to 90 minutes.    times a week and it usually takes 15-90 min to get back to sleep  Patient is usually up at  7 AM, sometimes 3-4 AM  Uses alarm:  yes    Weekends/Non-work Days/All Other Days:  Usually gets into bed at  11 PM   Takes patient about 10-20 min  to fall asleep  Patient is usually up at  7 AM  Uses alarm:  no    Sleep Need  Patient gets 7-8 hrs  sleep on average   Patient thinks he needs about  7-8 hrs sleep    Vishal HAGER Stephania prefers to sleep in this position(s): sides    Patient states they do the following activities in bed:  He will watch TV in bed    Naps  Patient takes a purposeful nap 0  times a week. He does not usually feel the need, but also does not have time.  He dozes off unintentionally watching TV in the evening a handful of times per year  Patient has had a driving accident or near-miss due to sleepiness/drowsiness:  no      SLEEP DISRUPTIONS:    Breathing/Snoring  Patient snores: no  Other people complain about his snoring:  no  Patient has been told he stops breathing in his sleep:    He has issues with the following:  Morning dry mouth, morning congestion, waking to urinate  He denies morning headaches or nocturnal reflux    Movement:  Patient gets pain, discomfort, with an urge to move:  yes- sometimes arm goes to sleep or he has to flip over 3-4 times per night. He denies restlessness in his legs at night.  It happens when he is resting:  no  It happens more at night: yes  Patient has been told he kicks his legs at night: no     Behaviors in Sleep:  Vishal MADAN Cat has experienced the following behaviors while sleeping:  Sleep talking, sometimes wakes making chewing motions.   Pt denies bruxism, sleep walking, and dream enactment behavior. Pt denies sleep  paralysis, hypnagogue and cataplexy.       Is there anything else you would like your sleep provider to know:        CAFFEINE AND OTHER SUBSTANCES:    Number of caffeinated beverages(coffee, tea, soda, energy drinks) that patient consumes 2-3 cups coffee and 16 oz soda per day:    Last caffeine use is usually:  noon  List of any prescribed or over the counter stimulants that patient takes:  no  List of any prescribed or over the counter sleep medication patient takes:  no  List of previous sleep medications that patient has tried:  Has tried melatonin or Advil PM but not for months, more when travelling a lot.  Patient drinks alcohol to help them sleep:  no  Patient drinks alcohol near bedtime:  Has about 3 per night around dinner or just after    Family History:  Patient has a family member been diagnosed with a sleep disorder:  brother      Social history: He works as a , [employer]. He would normally travel around the country about 50%, less lately. He has had more work stress. He lives with his wife and son (25)      SCALES:    EPWORTH SLEEPINESS SCALE      Harvel Sleepiness Scale ( BARI Pride  1990-1997[facility] [place]/English - Final version - 21 Nov 07 - [facility].) 3/9/2022   Harvel Score (Sleep) 3         INSOMNIA SEVERITY INDEX (CIRILO)      No flowsheet data found.    Guidelines for Scoring/Interpretation:    Total score categories:  0-7 = No clinically significant insomnia   8-14 = Subthreshold insomnia   15-21 = Clinical insomnia (moderate severity)  22-28 = Clinical insomnia (severe)    Used via courtesy of www.Mail.Ru Groupth.va.gov with permission from Efrain Paulson PhD., [facility]      STOP BANG     STOP BANG Questionnaire (  2008, the American Society of Anesthesiologists, Inc. Kathleen [employer]alo [employer] 12/13/2021   B/P Clinic: 130/74   BMI Clinic: 28.35         GAD7    No flowsheet data found.      CAGE-AID    No flowsheet data found.    CAGE-AID reprinted with  "permission from the [place] Medical Journal, ALEYDA De La O. and CARMEN Mccoy, \"Conjoint screening questionnaires for alcohol and drug abuse\" [place] Medical Journal 94: 135-140, 1995.      PATIENT HEALTH QUESTIONNAIRE-9 (PHQ - 9)    No flowsheet data found.    Developed by Blair Dorman, Liudmila Isabel, Ishaan Willard and colleagues, with an educational mihai from [employer] No permission required to reproduce, translate, display or distribute.        Allergies:    Allergies   Allergen Reactions     Bee Venom        Medications:    Current Outpatient Medications   Medication Sig Dispense Refill     aspirin 325 MG tablet [ASPIRIN 325 MG TABLET] Take 325 mg by mouth daily.       calcium-vitamin D (CALCIUM-VITAMIN D) 500 mg(1,250mg) -200 unit per tablet [CALCIUM-VITAMIN D (CALCIUM-VITAMIN D) 500 MG(1,250MG) -200 UNIT PER TABLET] Take 1 tablet by mouth daily.       EPINEPHrine (ANY BX GENERIC EQUIV) 0.3 MG/0.3ML injection 2-pack as needed       Lactobacillus rhamnosus GG (CULTURELLE) 10-15 Billion cell capsule [LACTOBACILLUS RHAMNOSUS GG (CULTURELLE) 10-15 BILLION CELL CAPSULE] Take 1 capsule by mouth daily.       metoprolol tartrate (LOPRESSOR) 50 MG tablet [METOPROLOL TARTRATE (LOPRESSOR) 50 MG TABLET] Take 50 mg by mouth 2 (two) times a day.        Omega-3 Fatty Acids (FISH OIL PO) Take 1 capsule by mouth daily       psyllium (METAMUCIL) 3.4 gram packet [PSYLLIUM (METAMUCIL) 3.4 GRAM PACKET] Take 1 packet by mouth daily.         Problem List:  Patient Active Problem List    Diagnosis Date Noted     Snoring 12/13/2021     Priority: Medium     Sleep study recommended 12/2021       Paroxysmal atrial fibrillation (H) 09/10/2015     Priority: Medium     Dx 2009  [id number] score = 0  Rx ASA 325mg + BB          Past Medical/Surgical History:  Past Medical History:   Diagnosis Date     Atrial fibrillation (H)      Snoring 12/13/2021    Sleep study recommended 12/2021     History reviewed. No pertinent surgical " history.    Social History:  Social History     Socioeconomic History     Marital status:      Spouse name: Not on file     Number of children: Not on file     Years of education: Not on file     Highest education level: Not on file   Occupational History     Not on file   Tobacco Use     Smoking status: Current Every Day Smoker     Packs/day: 1.00     Types: Cigarettes     Smokeless tobacco: Never Used     Tobacco comment: smoked for about as long as he can remember, mid teens   Substance and Sexual Activity     Alcohol use: Yes     Comment: few drinks nightly after dinner     Drug use: Not Currently     Sexual activity: Not on file   Other Topics Concern     Parent/sibling w/ CABG, MI or angioplasty before 65F 55M? Not Asked   Social History Narrative     Not on file     Social Determinants of Health     Financial Resource Strain: Not on file   Food Insecurity: Not on file   Transportation Needs: Not on file   Physical Activity: Not on file   Stress: Not on file   Social Connections: Not on file   Intimate Partner Violence: Not on file   Housing Stability: Not on file       Family History:  Family History   Problem Relation Age of Onset     Diabetes Mother      Diabetes Maternal Grandfather      Sleep Apnea Brother      Obesity Brother      Pacemaker Paternal Grandfather        Review of Systems:  A complete review of systems reviewed by me is negative with the exeption of what has been mentioned in the history of present illness.     ROS: 10 point ROS neg other than the symptoms noted below:  ENT: sinus pain, runny nose, post-nasal drainage  CARDIAC: rapid/irregular beats  PULMONARY:  SOB with acivity, productive cough  NEURO: numbness in arms or legs      Physical Examination:  Vitals: /79   Pulse 72   Ht 1.829 m (6')   Wt 92.1 kg (203 lb)   SpO2 96%   BMI 27.53 kg/m      Neck Cir (cm): 43 cm      GENERAL APPEARANCE: healthy, alert, no distress and cooperative  EYES: Eyes grossly normal to  inspection, PERRL and conjunctivae and sclerae normal  HENT: oropharynx crowded, soft palate dependent and tongue base sits above occlusal plane  NECK: no adenopathy, no asymmetry, masses, or scars and thyroid normal to palpation  RESP: lungs clear to auscultation - no rales, rhonchi or wheezes  CV: heart rate was fast, 150 bpm, likely a-fib but too fast for me to tell if it was irregularly irregular  LYMPHATICS: no cervical adenopathy  MS: extremities normal- no gross deformities noted  NEURO: Normal strength and tone, mentation intact, speech normal and cranial nerves 2-12 intact  Mallampati Class: IV.  Tonsillar Stage: 2  visible at pillars.         Data: All pertinent previous laboratory data reviewed     No results for input(s): NA, POTASSIUM, CHLORIDE, CO2, ANIONGAP, GLC, BUN, CR, FANNY in the last 98825 hours.    No results for input(s): WBC, RBC, HGB, HCT, MCV, MCH, MCHC, RDW, PLT in the last 39356 hours.    No results for input(s): PROTTOTAL, ALBUMIN, BILITOTAL, ALKPHOS, AST, ALT, BILIDIRECT in the last 70536 hours.    No results found for: PH, PHARTERIAL, PO2, [id number], SAT, PCO2, HCO3, BASEEXCESS, MAYRA, BEB    @LABRCNTIPR(phv:4,pco2v:4,po2v:4,hco3v:4,kaleb:4,o2per:4)@    No results found for: TSH    No results found for: UAMP, UBARB, BENZODIAZEUR, UCANN, UCOC, OPIT, UPCP    No results found for: 3282, MAT, MV3849, IRON    Echocardiology: No results found for this or any previous visit (from the past 4320 hour(s)).   1/28/22:2  The left ventricle is normal in size.  The visual ejection fraction is 65-70%.  Normal left ventricular wall motion  The right ventricle is normal in size and function.  The right ventricle is not well visualized.  IVC diameter and respiratory changes fall into an intermediate range  suggesting an RA pressure of 8 mmHg.  The rhythm was atrial flutter.  Rapid ventricular response      Chest x-ray: No results found for this or any previous visit from the past 365 days.      Chest CT: No  results found for this or any previous visit from the past 365 days.      PFT: Most Recent [facility] Pulmonary Function Testing    No results found for: 20001      Bennett Ezra Goltz, PA-C, ELIAS 3/8/2022

## 2022-03-10 ENCOUNTER — TELEPHONE (OUTPATIENT)
Dept: CARDIOLOGY | Facility: CLINIC | Age: 57
End: 2022-03-10

## 2022-03-10 ENCOUNTER — OFFICE VISIT (OUTPATIENT)
Dept: CARDIOLOGY | Facility: CLINIC | Age: 57
End: 2022-03-10
Attending: INTERNAL MEDICINE
Payer: COMMERCIAL

## 2022-03-10 VITALS
BODY MASS INDEX: 27.48 KG/M2 | SYSTOLIC BLOOD PRESSURE: 120 MMHG | RESPIRATION RATE: 20 BRPM | DIASTOLIC BLOOD PRESSURE: 82 MMHG | HEART RATE: 76 BPM | WEIGHT: 202.9 LBS | HEIGHT: 72 IN

## 2022-03-10 DIAGNOSIS — I48.0 PAROXYSMAL ATRIAL FIBRILLATION (H): Primary | ICD-10-CM

## 2022-03-10 PROCEDURE — 99215 OFFICE O/P EST HI 40 MIN: CPT | Performed by: NURSE PRACTITIONER

## 2022-03-10 RX ORDER — SOTALOL HYDROCHLORIDE 80 MG/1
80 TABLET ORAL 2 TIMES DAILY
Qty: 60 TABLET | Refills: 3 | Status: SHIPPED | OUTPATIENT
Start: 2022-03-10 | End: 2022-03-21

## 2022-03-10 RX ORDER — SOTALOL HYDROCHLORIDE 80 MG/1
80 TABLET ORAL 2 TIMES DAILY
Qty: 60 TABLET | Refills: 3 | Status: SHIPPED | OUTPATIENT
Start: 2022-03-12 | End: 2022-03-10

## 2022-03-10 NOTE — TELEPHONE ENCOUNTER
Note from pt pharmacy stating pt needing to  prescription.  PT saw Malika today and she started him on sotolol with plan to start Saturday with EKG Monday.  Prescription was written for pt to start 2/12, which doesn't give him time to  and start.     Will reenter prescription so pt can  prior to start date on 3/12/22.    Corrine

## 2022-03-10 NOTE — Clinical Note
Hi team, Patient as you know would like to move forward with ablation.  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  He is having an increased burden of atrial fibrillation and atrial flutter with RVR (13%) on recent Holter.  I have stopped his metoprolol and started him on sotalol  Thank you, Malika Streeter, CNP

## 2022-03-10 NOTE — PATIENT INSTRUCTIONS
Vishal MADAN Cat,    It was a pleasure to see you today at the [facility].     My recommendations after this visit include:    Stop the metoprolol after Friday evening dose    Saturday morning you will start sotalol 80mg twice per day about 12 hours apart    Monday EKG at the clinic    Start wearing you fitbit and watch for heart rate to be over 100 at rest    Follow up in June, Dr Murray end of year    My contact information:  Malika Streeter CNP  After Hours or Scheduling  [phone]  My Nurses phone number [phone]- normal business hours

## 2022-03-10 NOTE — PROGRESS NOTES
Assessment/Recommendations     Assessment:    1.  Paroxysmal atrial fibrillation-onset 2009.  Patient reported increasing frequency and duration and has been consulted for ablation with Dr. Ocampo.  Patient was having preablation echo done and presented in A. fib or flutter with tachycardia, echo unable to be done.  Patient presented for second time to echo and he was in atrial flutter with RVR.  Holter monitor worn and showed 13% atrial fibrillation and atrial flutter with rapid ventricular response.    -Discussed pathophysiology and chronic/progressive nature of atrial fibrillation.  Discussed rate versus rhythm control.  Patient is moving forward with ablation and currently there is a need for medication change due to increasing A. fib and a flutter with RVR.  -Stop metoprolol, start sotalol 80 mg p.o. twice daily (Saturday)  -Return to clinic Monday for EKG check after starting sotalol to assess QT interval  -BMP from 2020 reviewed and shows normal kidney function  -[id number] is 0.  Currently patient does not require blood thinners but is aware he will need to take them for ablation before and after.    2.  Assessment for JANET-patient has sleep study scheduled for June.    [id number] score of 0 and on aspirin 325 daily as directed by Dr. Ocampo.    Vishal Cat will follow up in June.       History of Present Illness/Subjective    Mr. Vishal Cat is a 56 year old male seen at [facility] today for management of atrial fibrillation and atrial flutter with RVR.      Vishal Cat has a known history of paroxysmal atrial fibrillation.    Vishal reports increasing amounts of atrial fibrillation with >20 episodes over the last year some lasting up to 16 hours.  Symptoms include warm feeling in chest, palpitations, fatigue, lightheadedness.  He was not aware of the amount of A. fib and a flutter he has been experiencing and it sees his symptoms are intermittent.  He reports feeling well  overall and he denies fatigue, lightheadedness, shortness of breath, dyspnea on exertion, orthopnea, PND, palpitations, chest pain, abdominal fullness/bloating and lower extremity edema.      Cardiographics (reviewed):    ECHO 1/20/2022 Interpretation Summary     The left ventricle is normal in size.  The visual ejection fraction is 65-70%.  Normal left ventricular wall motion  The right ventricle is normal in size and function.  The right ventricle is not well visualized.  IVC diameter and respiratory changes fall into an intermediate range  suggesting an RA pressure of 8 mmHg.  The rhythm was atrial flutter.  Rapid ventricular response    Cardiac testing personally reviewed:  INTERPRETATION Holter monitoring 3/4/2022 to 3/5/2022 (24hrs monitored) Predominant rhythm was sinus rhythm. Paroxysmal atrial fibrillation and atrial flutter accounting for 13% of the monitored interval. Heart rate range 56-165bpm, average 89bpm. 2 episodes of nonsustained atrial tachycardia, longest lasting 6 beats, fastest 189bpm. 3 episodes of nonsustained ventricular tachycardia, longest 6 beats, fastest 207bpm. These occurred during atrial fibrillation with rapid ventricular rates, and may partially be related to aberrancy. No pauses of over 3.0s. Rare supraventricular ectopy (0%). Rare ventricular ectopy (0%). Symptoms of palpitations correlated to sinus rhythm with supraventricular ectopy. IMPRESSION Sinus rhythm with paroxysmal atrial fibrillation and atrial flutter with associated rapid ventricular rates. Intervals of nonsustained wide complex tachycardia seen during atrial fibrillation with rapid ventricular rates may represent aberrancy vs nonsustained ventricular tachycardia. Confirmed by JONATHAN COHEN MD LOC:JN ([id number]) on 3/9/2022 6:26:21 PM         Physical Examination Review of Systems   Vitals: /82 (BP Location: Right arm, Patient Position: Sitting, Cuff Size: Adult Regular)   Pulse 76   Resp 20   Ht 1.829 m (6')    Wt 92 kg (202 lb 14.4 oz)   BMI 27.52 kg/m    BMI= Body mass index is 27.52 kg/m .  Wt Readings from Last 3 Encounters:   03/10/22 92 kg (202 lb 14.4 oz)   03/09/22 92.1 kg (203 lb)   12/13/21 94.8 kg (209 lb)       General Appearance:   Alert, cooperative and in no acute distress.   ENT/Mouth: membranes moist, no facial drooping   EYES:  no scleral icterus, normal conjunctivae   Neck: no JVD   Chest/Lungs:   lungs are clear to auscultation, no rales or wheezing, respirations unlabored   Cardiovascular:   Regular. Normal first and second heart sounds with no murmurs, rubs, or gallops; the radial and posterior tibial pulses are intact, no edema bilateral lower extremities    Abdomen:  Soft, nontender, nondistended, bowel sounds present   Extremities: no cyanosis or clubbing   Skin: warm, dry.    Neurologic: mood and affect are appropriate, alert and oriented x3         Please refer above for cardiac ROS details.      Medical History  Surgical History Family History Social History   Past Medical History:   Diagnosis Date     Atrial fibrillation (H)      Snoring 12/13/2021    Sleep study recommended 12/2021     No past surgical history on file.  Family History   Problem Relation Age of Onset     Diabetes Mother      Diabetes Maternal Grandfather      Sleep Apnea Brother      Obesity Brother      Pacemaker Paternal Grandfather     Social History     Socioeconomic History     Marital status:      Spouse name: Not on file     Number of children: Not on file     Years of education: Not on file     Highest education level: Not on file   Occupational History     Not on file   Tobacco Use     Smoking status: Current Every Day Smoker     Packs/day: 1.00     Types: Cigarettes     Smokeless tobacco: Never Used     Tobacco comment: smoked for about as long as he can remember, mid teens   Substance and Sexual Activity     Alcohol use: Yes     Comment: few drinks nightly after dinner     Drug use: Not Currently     Sexual  activity: Not on file   Other Topics Concern     Parent/sibling w/ CABG, MI or angioplasty before 65F 55M? Not Asked   Social History Narrative     Not on file     Social Determinants of Health     Financial Resource Strain: Not on file   Food Insecurity: Not on file   Transportation Needs: Not on file   Physical Activity: Not on file   Stress: Not on file   Social Connections: Not on file   Intimate Partner Violence: Not on file   Housing Stability: Not on file          Medications  Allergies   Current Outpatient Medications   Medication Sig Dispense Refill     aspirin 325 MG tablet [ASPIRIN 325 MG TABLET] Take 325 mg by mouth daily.       calcium-vitamin D (CALCIUM-VITAMIN D) 500 mg(1,250mg) -200 unit per tablet [CALCIUM-VITAMIN D (CALCIUM-VITAMIN D) 500 MG(1,250MG) -200 UNIT PER TABLET] Take 1 tablet by mouth daily.       EPINEPHrine (ANY BX GENERIC EQUIV) 0.3 MG/0.3ML injection 2-pack as needed        Lactobacillus rhamnosus GG (CULTURELLE) 10-15 Billion cell capsule [LACTOBACILLUS RHAMNOSUS GG (CULTURELLE) 10-15 BILLION CELL CAPSULE] Take 1 capsule by mouth daily.       metoprolol tartrate (LOPRESSOR) 50 MG tablet [METOPROLOL TARTRATE (LOPRESSOR) 50 MG TABLET] Take 50 mg by mouth 2 (two) times a day.        Omega-3 Fatty Acids (FISH OIL PO) Take 1 capsule by mouth daily       psyllium (METAMUCIL) 3.4 gram packet [PSYLLIUM (METAMUCIL) 3.4 GRAM PACKET] Take 1 packet by mouth daily.       VITAMIN D PO Take 5,000 Units by mouth      Allergies   Allergen Reactions     Bee Venom          Lab Results    Chemistry/lipid CBC Cardiac Enzymes/BNP/TSH/INR   No results found for: CHOL, HDL, TRIG, CREATININE, BUN, NA, CO2 No results found for: WBC, HGB, HCT, MCV, PLT No results found for: TROPONINI  No results found for: BNP, NTBNPI, NTBNP  No results found for: TSH  No results found for: INR     Total Time- 45 minutes spent on date of encounter doing chart review, history and exam, documentation and further activities as  noted above.  This note has been dictated using voice recognition software. Any grammatical, typographical, or context distortions are unintentional and inherent to the software.    Malika Streeter CNP   [facility]

## 2022-03-10 NOTE — LETTER
3/10/2022    JOHANN GRIFFITH  [facility] [address]  [place] [address]    RE: Vishal Cat       Dear Colleague,     I had the pleasure of seeing Vishal Cat in the [facility].      Assessment/Recommendations     Assessment:    1.  Paroxysmal atrial fibrillation-onset 2009.  Patient reported increasing frequency and duration and has been consulted for ablation with Dr. Ocampo.  Patient was having preablation echo done and presented in A. fib or flutter with tachycardia, echo unable to be done.  Patient presented for second time to echo and he was in atrial flutter with RVR.  Holter monitor worn and showed 13% atrial fibrillation and atrial flutter with rapid ventricular response.    -Discussed pathophysiology and chronic/progressive nature of atrial fibrillation.  Discussed rate versus rhythm control.  Patient is moving forward with ablation and currently there is a need for medication change due to increasing A. fib and a flutter with RVR.  -Stop metoprolol, start sotalol 80 mg p.o. twice daily (Saturday)  -Return to clinic Monday for EKG check after starting sotalol to assess QT interval  -BMP from 2020 reviewed and shows normal kidney function  -[id number] is 0.  Currently patient does not require blood thinners but is aware he will need to take them for ablation before and after.    2.  Assessment for JANET-patient has sleep study scheduled for June.    [id number] score of 0 and on aspirin 325 daily as directed by Dr. Ocampo.    Vishal Cat will follow up in June.       History of Present Illness/Subjective    . Vishal Cat is a 56 year old male seen at [facility] today for management of atrial fibrillation and atrial flutter with RVR.      Vishal Cat has a known history of paroxysmal atrial fibrillation.    Vishal reports increasing amounts of atrial fibrillation with >20 episodes over the last year some lasting up to 16 hours.  Symptoms include  warm feeling in chest, palpitations, fatigue, lightheadedness.  He was not aware of the amount of A. fib and a flutter he has been experiencing and it sees his symptoms are intermittent.  He reports feeling well overall and he denies fatigue, lightheadedness, shortness of breath, dyspnea on exertion, orthopnea, PND, palpitations, chest pain, abdominal fullness/bloating and lower extremity edema.      Cardiographics (reviewed):    ECHO 1/20/2022 Interpretation Summary     The left ventricle is normal in size.  The visual ejection fraction is 65-70%.  Normal left ventricular wall motion  The right ventricle is normal in size and function.  The right ventricle is not well visualized.  IVC diameter and respiratory changes fall into an intermediate range  suggesting an RA pressure of 8 mmHg.  The rhythm was atrial flutter.  Rapid ventricular response    Cardiac testing personally reviewed:  INTERPRETATION Holter monitoring 3/4/2022 to 3/5/2022 (24hrs monitored) Predominant rhythm was sinus rhythm. Paroxysmal atrial fibrillation and atrial flutter accounting for 13% of the monitored interval. Heart rate range 56-165bpm, average 89bpm. 2 episodes of nonsustained atrial tachycardia, longest lasting 6 beats, fastest 189bpm. 3 episodes of nonsustained ventricular tachycardia, longest 6 beats, fastest 207bpm. These occurred during atrial fibrillation with rapid ventricular rates, and may partially be related to aberrancy. No pauses of over 3.0s. Rare supraventricular ectopy (0%). Rare ventricular ectopy (0%). Symptoms of palpitations correlated to sinus rhythm with supraventricular ectopy. IMPRESSION Sinus rhythm with paroxysmal atrial fibrillation and atrial flutter with associated rapid ventricular rates. Intervals of nonsustained wide complex tachycardia seen during atrial fibrillation with rapid ventricular rates may represent aberrancy vs nonsustained ventricular tachycardia. Confirmed by JONATHAN COHEN MD LOC:JN  ([id number]) on 3/9/2022 6:26:21 PM         Physical Examination Review of Systems   Vitals: /82 (BP Location: Right arm, Patient Position: Sitting, Cuff Size: Adult Regular)   Pulse 76   Resp 20   Ht 1.829 m (6')   Wt 92 kg (202 lb 14.4 oz)   BMI 27.52 kg/m    BMI= Body mass index is 27.52 kg/m .  Wt Readings from Last 3 Encounters:   03/10/22 92 kg (202 lb 14.4 oz)   03/09/22 92.1 kg (203 lb)   12/13/21 94.8 kg (209 lb)       General Appearance:   Alert, cooperative and in no acute distress.   ENT/Mouth: membranes moist, no facial drooping   EYES:  no scleral icterus, normal conjunctivae   Neck: no JVD   Chest/Lungs:   lungs are clear to auscultation, no rales or wheezing, respirations unlabored   Cardiovascular:   Regular. Normal first and second heart sounds with no murmurs, rubs, or gallops; the radial and posterior tibial pulses are intact, no edema bilateral lower extremities    Abdomen:  Soft, nontender, nondistended, bowel sounds present   Extremities: no cyanosis or clubbing   Skin: warm, dry.    Neurologic: mood and affect are appropriate, alert and oriented x3         Please refer above for cardiac ROS details.      Medical History  Surgical History Family History Social History   Past Medical History:   Diagnosis Date     Atrial fibrillation (H)      Snoring 12/13/2021    Sleep study recommended 12/2021     No past surgical history on file.  Family History   Problem Relation Age of Onset     Diabetes Mother      Diabetes Maternal Grandfather      Sleep Apnea Brother      Obesity Brother      Pacemaker Paternal Grandfather     Social History     Socioeconomic History     Marital status:      Spouse name: Not on file     Number of children: Not on file     Years of education: Not on file     Highest education level: Not on file   Occupational History     Not on file   Tobacco Use     Smoking status: Current Every Day Smoker     Packs/day: 1.00     Types: Cigarettes     Smokeless tobacco:  Never Used     Tobacco comment: smoked for about as long as he can remember, mid teens   Substance and Sexual Activity     Alcohol use: Yes     Comment: few drinks nightly after dinner     Drug use: Not Currently     Sexual activity: Not on file   Other Topics Concern     Parent/sibling w/ CABG, MI or angioplasty before 65F 55M? Not Asked   Social History Narrative     Not on file     Social Determinants of Health     Financial Resource Strain: Not on file   Food Insecurity: Not on file   Transportation Needs: Not on file   Physical Activity: Not on file   Stress: Not on file   Social Connections: Not on file   Intimate Partner Violence: Not on file   Housing Stability: Not on file          Medications  Allergies   Current Outpatient Medications   Medication Sig Dispense Refill     aspirin 325 MG tablet [ASPIRIN 325 MG TABLET] Take 325 mg by mouth daily.       calcium-vitamin D (CALCIUM-VITAMIN D) 500 mg(1,250mg) -200 unit per tablet [CALCIUM-VITAMIN D (CALCIUM-VITAMIN D) 500 MG(1,250MG) -200 UNIT PER TABLET] Take 1 tablet by mouth daily.       EPINEPHrine (ANY BX GENERIC EQUIV) 0.3 MG/0.3ML injection 2-pack as needed        Lactobacillus rhamnosus GG (CULTURELLE) 10-15 Billion cell capsule [LACTOBACILLUS RHAMNOSUS GG (CULTURELLE) 10-15 BILLION CELL CAPSULE] Take 1 capsule by mouth daily.       metoprolol tartrate (LOPRESSOR) 50 MG tablet [METOPROLOL TARTRATE (LOPRESSOR) 50 MG TABLET] Take 50 mg by mouth 2 (two) times a day.        Omega-3 Fatty Acids (FISH OIL PO) Take 1 capsule by mouth daily       psyllium (METAMUCIL) 3.4 gram packet [PSYLLIUM (METAMUCIL) 3.4 GRAM PACKET] Take 1 packet by mouth daily.       VITAMIN D PO Take 5,000 Units by mouth      Allergies   Allergen Reactions     Bee Venom          Lab Results    Chemistry/lipid CBC Cardiac Enzymes/BNP/TSH/INR   No results found for: CHOL, HDL, TRIG, CREATININE, BUN, NA, CO2 No results found for: WBC, HGB, HCT, MCV, PLT No results found for: TROPONINI  No  results found for: BNP, NTBNPI, NTBNP  No results found for: TSH  No results found for: INR     Total Time- 45 minutes spent on date of encounter doing chart review, history and exam, documentation and further activities as noted above.  This note has been dictated using voice recognition software. Any grammatical, typographical, or context distortions are unintentional and inherent to the software.    CARLEY Gonsalez [facility]      Thank you for allowing me to participate in the care of your patient.      Sincerely,     PRAMOD Gonsalez CNP [facility]  cc:   Akbar Ocampo MD  [address] [place] [address]

## 2022-03-14 ENCOUNTER — ALLIED HEALTH/NURSE VISIT (OUTPATIENT)
Dept: CARDIOLOGY | Facility: CLINIC | Age: 57
End: 2022-03-14
Payer: COMMERCIAL

## 2022-03-14 VITALS — DIASTOLIC BLOOD PRESSURE: 86 MMHG | SYSTOLIC BLOOD PRESSURE: 138 MMHG

## 2022-03-14 DIAGNOSIS — I48.0 PAROXYSMAL ATRIAL FIBRILLATION (H): ICD-10-CM

## 2022-03-14 LAB
ATRIAL RATE - MUSE: 67 BPM
DIASTOLIC BLOOD PRESSURE - MUSE: NORMAL MMHG
INTERPRETATION ECG - MUSE: NORMAL
P AXIS - MUSE: 56 DEGREES
PR INTERVAL - MUSE: 158 MS
QRS DURATION - MUSE: 90 MS
QT - MUSE: 420 MS
QTC - MUSE: 443 MS
R AXIS - MUSE: 14 DEGREES
SYSTOLIC BLOOD PRESSURE - MUSE: NORMAL MMHG
T AXIS - MUSE: 48 DEGREES
VENTRICULAR RATE- MUSE: 67 BPM

## 2022-03-14 PROCEDURE — 93000 ELECTROCARDIOGRAM COMPLETE: CPT | Performed by: GENERAL ACUTE CARE HOSPITAL

## 2022-03-14 PROCEDURE — 99207 PR NO CHARGE NURSE ONLY: CPT

## 2022-03-14 NOTE — PROGRESS NOTES
EKG Visit    Pt here for EKG, QTc check after starting 80mg Sotalol BID on 3/12/22    EKG shows SR with HR of 67 bpm, QT of 420 ms, and QTc of 443 ms  QTc within acceptable limits, pts EKG was compared to previous EKG in EMR, EKG is stable, and there has been minimal change in QTc  Vital signes were reviewed and are stable    Pt reports tolerating medication without complaint, reviewed with pt reasoning for above medication, reviewed and confirmed pt understands current dose, administration, and frequency of medication, most common potential side effects from the medication, s/s to call the clinic with and when to seek emergency medical care  Ptwas instructed to continue current medication as prescribed, results would be sent to ordering provider for review, pt will be contacted with further changes if necessary and pt verbalized understanding   Results sent to  Malika Streeter NP for further review and recommendations if necessary      Patient still notes daily episodes of AF, although they are shorter and less frequent.  He is encouraged to give us an update at the end of the week.  Reviewed giving the medication time.  3/15/2022 10:33 AM  Nevaeh Shea RN             3/14/2022 2:37 PM  Nevaeh Shea RN

## 2022-03-15 DIAGNOSIS — I48.0 PAROXYSMAL ATRIAL FIBRILLATION (H): Primary | ICD-10-CM

## 2022-03-15 NOTE — PROGRESS NOTES
Gisell Douglas, PRAMOD CNP  Nevaeh Shea, RN  EKG reviewed in Malika's absence.  Qtc ok, continue sotalol at current dose.  Continue to track AF episode frequency/duration.  Follow up with Malika in 2-3 weeks.   Thanks,   Gisell     Noted.  Order placed for malika follow up.  JW

## 2022-03-18 ENCOUNTER — MYC MEDICAL ADVICE (OUTPATIENT)
Dept: CARDIOLOGY | Facility: CLINIC | Age: 57
End: 2022-03-18
Payer: COMMERCIAL

## 2022-03-18 DIAGNOSIS — I48.0 PAROXYSMAL ATRIAL FIBRILLATION (H): Primary | ICD-10-CM

## 2022-03-18 NOTE — TELEPHONE ENCOUNTER
Malika- Mega also called and spoke to us regarding his episodes, he states they are definitely shorter and not as often but still having breakthrough since EKG on Monday.  Thoughts on increasing? Or would you like to have him continue to monitor?    I did let him know that we likely would get back to him Monday since it was late in the afternoon Friday.    Thanks!    Corrine

## 2022-03-21 RX ORDER — SOTALOL HYDROCHLORIDE 120 MG/1
120 TABLET ORAL 2 TIMES DAILY
Qty: 180 TABLET | Refills: 3
Start: 2022-03-21 | End: 2022-03-25

## 2022-03-21 NOTE — TELEPHONE ENCOUNTER
"Spoke to pt regarding medication increase.  He states understanding and will increase his sotolol starting tomorrow morning to 120mg BID and come in for EKG on Thursday at 3pm at  per his preference.    Let pt know that since the EKG will be done at , the [facility] will forward results to us and we will give him a call afterwards.  Pt expressed understanding and will look forward to our call.    Will update medication list to show pt taking 120mg BID, he will use his current prescription and cut pills in half to get to the 120mg dosage prior to EKG and if he is to continue this dose will request a refill after EKG Thursday.    No further questions or concerns at this time.    Denies bradycardia or hypotension, states while on the current dose of sotolol the afib \"taps him on the shoulder to just let him know its there\" but is much better than previous.  He is hopeful that the increase in sotolol will get rid of the episodes.    Pt has spoke to our  and stated he preferred spring/summer timeframe for scheduling ablation.  Our  will be in contact with him to schedule PVI for a time that works with his schedule.    Corrine      "

## 2022-03-21 NOTE — TELEPHONE ENCOUNTER
Malika Streeter APRN CNP Holen, Megan, TRACEE Castle,   When is his ablation? Is that scheduled yet?     OK to increase the sotalol to 120mg BID with repeat EKG after fifth dose.  Please assess him for any bradycardia or hypotension.     Malika Streeter CNP

## 2022-03-24 ENCOUNTER — ALLIED HEALTH/NURSE VISIT (OUTPATIENT)
Dept: CARDIOLOGY | Facility: CLINIC | Age: 57
End: 2022-03-24
Payer: COMMERCIAL

## 2022-03-24 VITALS — DIASTOLIC BLOOD PRESSURE: 84 MMHG | HEART RATE: 55 BPM | SYSTOLIC BLOOD PRESSURE: 132 MMHG

## 2022-03-24 DIAGNOSIS — I48.0 PAROXYSMAL ATRIAL FIBRILLATION (H): ICD-10-CM

## 2022-03-24 LAB
ATRIAL RATE - MUSE: 55 BPM
DIASTOLIC BLOOD PRESSURE - MUSE: NORMAL MMHG
INTERPRETATION ECG - MUSE: NORMAL
P AXIS - MUSE: 53 DEGREES
PR INTERVAL - MUSE: 158 MS
QRS DURATION - MUSE: 84 MS
QT - MUSE: 448 MS
QTC - MUSE: 428 MS
R AXIS - MUSE: 13 DEGREES
SYSTOLIC BLOOD PRESSURE - MUSE: NORMAL MMHG
T AXIS - MUSE: 34 DEGREES
VENTRICULAR RATE- MUSE: 55 BPM

## 2022-03-24 PROCEDURE — 93000 ELECTROCARDIOGRAM COMPLETE: CPT | Performed by: INTERNAL MEDICINE

## 2022-03-24 PROCEDURE — 99207 PR NO CHARGE NURSE ONLY: CPT

## 2022-03-24 NOTE — NURSING NOTE
Noted. Message received 3/24/2022 @ 4:15  Malika Streeter APRN CNP Fleischhacker, Kelly, RN  Thank you Kaylene,   EKG looks acceptable and nothing further needed, sounds like pt is tolerating it well     Malika Streeter CNP

## 2022-03-24 NOTE — NURSING NOTE
"Patient present to the clinic today for follow up Qt check after recent increase of Sotalol.Patient has had 5 doses of increased sotalol prior to EKG. His current sotalol dose is 120 mg since 3/22/2022.    Patient states he is doing \"well\" and denies lightheadedness, dizziness or fatigue. He does note chronic dizziness from vertigo and will to continue to monitor.   Patient has follow up planned in June.    EKG reveals sinus bradycardia  HR 55   ms  QRS 84 ms  QT/QTc  448/428 ms    Patient was instructed provider will review and if new orders are given a return call will be made. Patient verbalizes understanding and agrees with plan. Patient will follow up as planned. No further questions or concerns.    Malika,  Please review EKG.   Any further recommendations or instructions?  "

## 2022-03-25 DIAGNOSIS — I48.0 PAROXYSMAL ATRIAL FIBRILLATION (H): ICD-10-CM

## 2022-03-25 RX ORDER — SOTALOL HYDROCHLORIDE 120 MG/1
120 TABLET ORAL 2 TIMES DAILY
Qty: 180 TABLET | Refills: 3 | Status: SHIPPED | OUTPATIENT
Start: 2022-03-25 | End: 2022-05-09

## 2022-04-30 ENCOUNTER — HEALTH MAINTENANCE LETTER (OUTPATIENT)
Age: 57
End: 2022-04-30

## 2022-05-09 ENCOUNTER — PREP FOR PROCEDURE (OUTPATIENT)
Dept: CARDIOLOGY | Facility: CLINIC | Age: 57
End: 2022-05-09
Payer: COMMERCIAL

## 2022-05-09 ENCOUNTER — TELEPHONE (OUTPATIENT)
Dept: CARDIOLOGY | Facility: CLINIC | Age: 57
End: 2022-05-09
Payer: COMMERCIAL

## 2022-05-09 DIAGNOSIS — I48.0 PAROXYSMAL ATRIAL FIBRILLATION (H): Primary | ICD-10-CM

## 2022-05-09 RX ORDER — SOTALOL HYDROCHLORIDE 160 MG/1
160 TABLET ORAL 2 TIMES DAILY
Qty: 60 TABLET | Refills: 3 | Status: SHIPPED | OUTPATIENT
Start: 2022-05-09 | End: 2022-05-09

## 2022-05-09 NOTE — TELEPHONE ENCOUNTER
Phone call to patient, reviewed starting Eliquis 5 mg bid 4 weeks prior to PVI with SWA on 7-20-22.  Pt to start eliquis on 6-22-22.  Coupon cards sent in the mail with educational letter.  RX sent to his pharmacy answered all questions.  Reviewed contact information.

## 2022-05-12 ENCOUNTER — OFFICE VISIT (OUTPATIENT)
Dept: CARDIOLOGY | Facility: CLINIC | Age: 57
End: 2022-05-12
Attending: NURSE PRACTITIONER
Payer: COMMERCIAL

## 2022-05-12 VITALS
SYSTOLIC BLOOD PRESSURE: 100 MMHG | DIASTOLIC BLOOD PRESSURE: 70 MMHG | HEART RATE: 56 BPM | WEIGHT: 200.6 LBS | BODY MASS INDEX: 27.21 KG/M2

## 2022-05-12 DIAGNOSIS — I48.0 PAROXYSMAL ATRIAL FIBRILLATION (H): Primary | ICD-10-CM

## 2022-05-12 LAB
ATRIAL RATE - MUSE: 59 BPM
DIASTOLIC BLOOD PRESSURE - MUSE: NORMAL MMHG
INTERPRETATION ECG - MUSE: NORMAL
P AXIS - MUSE: 71 DEGREES
PR INTERVAL - MUSE: 164 MS
QRS DURATION - MUSE: 88 MS
QT - MUSE: 460 MS
QTC - MUSE: 455 MS
R AXIS - MUSE: 22 DEGREES
SYSTOLIC BLOOD PRESSURE - MUSE: NORMAL MMHG
T AXIS - MUSE: 75 DEGREES
VENTRICULAR RATE- MUSE: 59 BPM

## 2022-05-12 PROCEDURE — 99213 OFFICE O/P EST LOW 20 MIN: CPT | Performed by: NURSE PRACTITIONER

## 2022-05-12 PROCEDURE — 93000 ELECTROCARDIOGRAM COMPLETE: CPT | Performed by: INTERNAL MEDICINE

## 2022-05-12 NOTE — PROGRESS NOTES
Assessment/Recommendations     Assessment:    1.  Paroxysmal atrial fibrillation-onset 2009.  Patient reported increasing frequency and duration and has been consulted for ablation with Dr. Ocampo.  Patient was having preablation echo done and presented in A. fib or flutter with tachycardia, echo unable to be done.  Patient presented for second time to echo and he was in atrial flutter with RVR.  Holter monitor worn 3/4/22 and showed 13% atrial fibrillation and atrial flutter with rapid ventricular response.  Started on sotalol 80 mg p.o. twice daily and have titrated to 160 mg p.o. twice daily as of 2 days ago    -Discussed pathophysiology and chronic/progressive nature of atrial fibrillation.  Discussed rate versus rhythm control.  Patient is moving forward with ablation 7/20/2022  -Continue sotalol 160 mg p.o. twice daily, EKG check today  -Okay to take metoprolol titrate 25 mg if atrial fibrillation reoccurs despite sotalol increase, monitor self for symptoms of hypotension  -[id number] is 0.  Currently patient does not require blood thinners but is aware he will need to take them for ablation before and after.  Plan to start Eliquis 6/22/2022 as directed by Dr. Ocampo, patient has already filled prescription  -Reiterated avoidance of triggers although patient says there is no correlation he has been able to make    2.  Assessment for JANET-patient has sleep study scheduled for June.    [id number] score of 0 and on aspirin 325 daily as directed by Dr. Ocampo.    Vishal Cat will follow up in June.    Addendum after communications with Dr. Ocampo  if he has further prolonged recurrence on sotalol 160 mg twice daily then I would switch him to amiodarone short-term and continue it up to the time of his ablation.   I would load him with 200 mg 3 3 times daily for 3 weeks if need be.        History of Present Illness/Subjective    . Vishal Cat is a 56 year old male seen at [facility]  today for management of atrial fibrillation and atrial flutter with RVR.      Vishal Cat has a known history of paroxysmal atrial fibrillation.    Vishal reports ongoing episodes of atrial fibrillation despite sotalol increase.  We discussed how more time is needed to see if this higher dose of sotalol will take effect.  Symptoms include warm feeling in chest, palpitations, fatigue, lightheadedness.  He reports feeling well overall and he denies fatigue, lightheadedness, shortness of breath, dyspnea on exertion, orthopnea, PND, palpitations, chest pain, abdominal fullness/bloating and lower extremity edema.      Cardiographics (reviewed):    ECHO 1/20/2022 Interpretation Summary     The left ventricle is normal in size.  The visual ejection fraction is 65-70%.  Normal left ventricular wall motion  The right ventricle is normal in size and function.  The right ventricle is not well visualized.  IVC diameter and respiratory changes fall into an intermediate range  suggesting an RA pressure of 8 mmHg.  The rhythm was atrial flutter.  Rapid ventricular response    Cardiac testing personally reviewed:  INTERPRETATION Holter monitoring 3/4/2022 to 3/5/2022 (24hrs monitored) Predominant rhythm was sinus rhythm. Paroxysmal atrial fibrillation and atrial flutter accounting for 13% of the monitored interval. Heart rate range 56-165bpm, average 89bpm. 2 episodes of nonsustained atrial tachycardia, longest lasting 6 beats, fastest 189bpm. 3 episodes of nonsustained ventricular tachycardia, longest 6 beats, fastest 207bpm. These occurred during atrial fibrillation with rapid ventricular rates, and may partially be related to aberrancy. No pauses of over 3.0s. Rare supraventricular ectopy (0%). Rare ventricular ectopy (0%). Symptoms of palpitations correlated to sinus rhythm with supraventricular ectopy. IMPRESSION Sinus rhythm with paroxysmal atrial fibrillation and atrial flutter with associated rapid ventricular rates.  Intervals of nonsustained wide complex tachycardia seen during atrial fibrillation with rapid ventricular rates may represent aberrancy vs nonsustained ventricular tachycardia. Confirmed by JONATHAN COHEN MD LOC:JN ([id number]) on 3/9/2022 6:26:21 PM           Physical Examination Review of Systems   Vitals: /70 (BP Location: Right arm, Patient Position: Sitting, Cuff Size: Adult Large)   Pulse 56   Wt 91 kg (200 lb 9.6 oz)   BMI 27.21 kg/m    BMI= Body mass index is 27.21 kg/m .  Wt Readings from Last 3 Encounters:   05/12/22 91 kg (200 lb 9.6 oz)   03/10/22 92 kg (202 lb 14.4 oz)   03/09/22 92.1 kg (203 lb)       General Appearance:   Alert, cooperative and in no acute distress.   ENT/Mouth: membranes moist, no facial drooping   EYES:  no scleral icterus, normal conjunctivae   Neck: no JVD   Chest/Lungs:   lungs are clear to auscultation, no rales or wheezing, respirations unlabored   Cardiovascular:   Regular. Normal first and second heart sounds with no murmurs, rubs, or gallops; the radial and posterior tibial pulses are intact, no edema bilateral lower extremities    Abdomen:  Soft, nontender, nondistended, bowel sounds present   Extremities: no cyanosis or clubbing   Skin: warm, dry.    Neurologic: mood and affect are appropriate, alert and oriented x3         Please refer above for cardiac ROS details.      Medical History  Surgical History Family History Social History   Past Medical History:   Diagnosis Date     Atrial fibrillation (H)      Snoring 12/13/2021    Sleep study recommended 12/2021     No past surgical history on file.  Family History   Problem Relation Age of Onset     Diabetes Mother      Diabetes Maternal Grandfather      Sleep Apnea Brother      Obesity Brother      Pacemaker Paternal Grandfather     Social History     Socioeconomic History     Marital status:      Spouse name: Not on file     Number of children: Not on file     Years of education: Not on file     Highest  education level: Not on file   Occupational History     Not on file   Tobacco Use     Smoking status: Current Every Day Smoker     Packs/day: 1.00     Types: Cigarettes     Smokeless tobacco: Never Used     Tobacco comment: smoked for about as long as he can remember, mid teens   Substance and Sexual Activity     Alcohol use: Yes     Comment: few drinks nightly after dinner     Drug use: Not Currently     Sexual activity: Not on file   Other Topics Concern     Parent/sibling w/ CABG, MI or angioplasty before 65F 55M? Not Asked   Social History Narrative     Not on file     Social Determinants of Health     Financial Resource Strain: Not on file   Food Insecurity: Not on file   Transportation Needs: Not on file   Physical Activity: Not on file   Stress: Not on file   Social Connections: Not on file   Intimate Partner Violence: Not on file   Housing Stability: Not on file          Medications  Allergies   Current Outpatient Medications   Medication Sig Dispense Refill     calcium-vitamin D (CALCIUM-VITAMIN D) 500 mg(1,250mg) -200 unit per tablet [CALCIUM-VITAMIN D (CALCIUM-VITAMIN D) 500 MG(1,250MG) -200 UNIT PER TABLET] Take 1 tablet by mouth daily.       EPINEPHrine (ANY BX GENERIC EQUIV) 0.3 MG/0.3ML injection 2-pack as needed        Lactobacillus rhamnosus GG (CULTURELLE) 10-15 Billion cell capsule [LACTOBACILLUS RHAMNOSUS GG (CULTURELLE) 10-15 BILLION CELL CAPSULE] Take 1 capsule by mouth daily.       Omega-3 Fatty Acids (FISH OIL PO) Take 1 capsule by mouth daily       psyllium (METAMUCIL) 3.4 gram packet [PSYLLIUM (METAMUCIL) 3.4 GRAM PACKET] Take 1 packet by mouth daily.       sotalol (BETAPACE) 160 MG tablet TAKE 1 TABLET(160 MG) BY MOUTH TWICE DAILY 180 tablet 1     VITAMIN D PO Take 5,000 Units by mouth       apixaban ANTICOAGULANT (ELIQUIS ANTICOAGULANT) 5 MG tablet Take 1 tablet (5 mg) by mouth 2 times daily ;  Start on 6-22-22, stop Aspirin (Patient not taking: Reported on 5/12/2022) 60 tablet 6     Allergies   Allergen Reactions     Bee Venom          Lab Results    Chemistry/lipid CBC Cardiac Enzymes/BNP/TSH/INR   No results found for: CHOL, HDL, TRIG, CHOLHDL, CREATININE, BUN, NA, CO2 No results found for: WBC, HGB, HCT, MCV, PLT No results found for: TROPONINI  No results found for: BNP, NTBNPI, NTBNP  No results found for: TSH  No results found for: INR     Total Time- 25 minutes spent on date of encounter doing chart review, history and exam, documentation and further activities as noted above.  This note has been dictated using voice recognition software. Any grammatical, typographical, or context distortions are unintentional and inherent to the software.    Malika Streeter [facility]

## 2022-05-12 NOTE — Clinical Note
James Ocampo, Saw your patient in clinic today who is scheduled with you for PVI ablation 7/20/2022.  2 days ago I titrated him to sotalol 160 mg p.o. twice daily, yesterday he was in A. fib with RVR for multiple hours but it has resolved today.  I recommended to him waiting longer to see if the sotalol at elevated dose would take effect.  I recommend he also try metoprolol titrate 25 mg as needed at onset of atrial fibrillation and watch for hypotension.  If he has recurrence of atrial fibrillation on highest dose sotalol I am not sure which direction he would want him to go with med management Thank you Malika Streeter, CNP

## 2022-05-12 NOTE — PATIENT INSTRUCTIONS
Vishal MADAN Cat,    It was a pleasure to see you today at the [facility].     My recommendations after this visit include:    Continue same meds, hopefully the a fib will calm down    OK to take metoprolol tartrate 25mg if a fib is > 1 hour      My contact information:  Malika Streeter CNP  After Hours or Scheduling  [phone]  My Nurses phone number [phone]- normal business hours

## 2022-05-12 NOTE — LETTER
5/12/2022    JOHANN GRIFFITH  [facility] [address]  [place] [address]    RE: Vishal MADAN Stephania       Dear Colleague,     I had the pleasure of seeing Vishal Cat in the [facility].      Assessment/Recommendations     Assessment:    1.  Paroxysmal atrial fibrillation-onset 2009.  Patient reported increasing frequency and duration and has been consulted for ablation with Dr. Ocampo.  Patient was having preablation echo done and presented in A. fib or flutter with tachycardia, echo unable to be done.  Patient presented for second time to echo and he was in atrial flutter with RVR.  Holter monitor worn 3/4/22 and showed 13% atrial fibrillation and atrial flutter with rapid ventricular response.  Started on sotalol 80 mg p.o. twice daily and have titrated to 160 mg p.o. twice daily as of 2 days ago    -Discussed pathophysiology and chronic/progressive nature of atrial fibrillation.  Discussed rate versus rhythm control.  Patient is moving forward with ablation 7/20/2022  -Continue sotalol 160 mg p.o. twice daily, EKG check today  -Okay to take metoprolol titrate 25 mg if atrial fibrillation reoccurs despite sotalol increase, monitor self for symptoms of hypotension  -[id number] is 0.  Currently patient does not require blood thinners but is aware he will need to take them for ablation before and after.  Plan to start Eliquis 6/22/2022 as directed by Dr. Ocampo, patient has already filled prescription  -Reiterated avoidance of triggers although patient says there is no correlation he has been able to make    2.  Assessment for JANET-patient has sleep study scheduled for June.    [id number] score of 0 and on aspirin 325 daily as directed by Dr. Ocampo.    Vishal Coxas will follow up in June.       History of Present Illness/Subjective    Mr. Vishal Cat is a 56 year old male seen at [facility] today for management of atrial fibrillation and atrial flutter with RVR.       Vishal Cat has a known history of paroxysmal atrial fibrillation.    Vishal reports ongoing episodes of atrial fibrillation despite sotalol increase.  We discussed how more time is needed to see if this higher dose of sotalol will take effect.  Symptoms include warm feeling in chest, palpitations, fatigue, lightheadedness.  He reports feeling well overall and he denies fatigue, lightheadedness, shortness of breath, dyspnea on exertion, orthopnea, PND, palpitations, chest pain, abdominal fullness/bloating and lower extremity edema.      Cardiographics (reviewed):    ECHO 1/20/2022 Interpretation Summary     The left ventricle is normal in size.  The visual ejection fraction is 65-70%.  Normal left ventricular wall motion  The right ventricle is normal in size and function.  The right ventricle is not well visualized.  IVC diameter and respiratory changes fall into an intermediate range  suggesting an RA pressure of 8 mmHg.  The rhythm was atrial flutter.  Rapid ventricular response    Cardiac testing personally reviewed:  INTERPRETATION Holter monitoring 3/4/2022 to 3/5/2022 (24hrs monitored) Predominant rhythm was sinus rhythm. Paroxysmal atrial fibrillation and atrial flutter accounting for 13% of the monitored interval. Heart rate range 56-165bpm, average 89bpm. 2 episodes of nonsustained atrial tachycardia, longest lasting 6 beats, fastest 189bpm. 3 episodes of nonsustained ventricular tachycardia, longest 6 beats, fastest 207bpm. These occurred during atrial fibrillation with rapid ventricular rates, and may partially be related to aberrancy. No pauses of over 3.0s. Rare supraventricular ectopy (0%). Rare ventricular ectopy (0%). Symptoms of palpitations correlated to sinus rhythm with supraventricular ectopy. IMPRESSION Sinus rhythm with paroxysmal atrial fibrillation and atrial flutter with associated rapid ventricular rates. Intervals of nonsustained wide complex tachycardia seen during atrial  fibrillation with rapid ventricular rates may represent aberrancy vs nonsustained ventricular tachycardia. Confirmed by JONATHAN COHEN MD LOC:JN ([id number]) on 3/9/2022 6:26:21 PM           Physical Examination Review of Systems   Vitals: /70 (BP Location: Right arm, Patient Position: Sitting, Cuff Size: Adult Large)   Pulse 56   Wt 91 kg (200 lb 9.6 oz)   BMI 27.21 kg/m    BMI= Body mass index is 27.21 kg/m .  Wt Readings from Last 3 Encounters:   05/12/22 91 kg (200 lb 9.6 oz)   03/10/22 92 kg (202 lb 14.4 oz)   03/09/22 92.1 kg (203 lb)       General Appearance:   Alert, cooperative and in no acute distress.   ENT/Mouth: membranes moist, no facial drooping   EYES:  no scleral icterus, normal conjunctivae   Neck: no JVD   Chest/Lungs:   lungs are clear to auscultation, no rales or wheezing, respirations unlabored   Cardiovascular:   Regular. Normal first and second heart sounds with no murmurs, rubs, or gallops; the radial and posterior tibial pulses are intact, no edema bilateral lower extremities    Abdomen:  Soft, nontender, nondistended, bowel sounds present   Extremities: no cyanosis or clubbing   Skin: warm, dry.    Neurologic: mood and affect are appropriate, alert and oriented x3         Please refer above for cardiac ROS details.      Medical History  Surgical History Family History Social History   Past Medical History:   Diagnosis Date     Atrial fibrillation (H)      Snoring 12/13/2021    Sleep study recommended 12/2021     No past surgical history on file.  Family History   Problem Relation Age of Onset     Diabetes Mother      Diabetes Maternal Grandfather      Sleep Apnea Brother      Obesity Brother      Pacemaker Paternal Grandfather     Social History     Socioeconomic History     Marital status:      Spouse name: Not on file     Number of children: Not on file     Years of education: Not on file     Highest education level: Not on file   Occupational History     Not on file    Tobacco Use     Smoking status: Current Every Day Smoker     Packs/day: 1.00     Types: Cigarettes     Smokeless tobacco: Never Used     Tobacco comment: smoked for about as long as he can remember, mid teens   Substance and Sexual Activity     Alcohol use: Yes     Comment: few drinks nightly after dinner     Drug use: Not Currently     Sexual activity: Not on file   Other Topics Concern     Parent/sibling w/ CABG, MI or angioplasty before 65F 55M? Not Asked   Social History Narrative     Not on file     Social Determinants of Health     Financial Resource Strain: Not on file   Food Insecurity: Not on file   Transportation Needs: Not on file   Physical Activity: Not on file   Stress: Not on file   Social Connections: Not on file   Intimate Partner Violence: Not on file   Housing Stability: Not on file          Medications  Allergies   Current Outpatient Medications   Medication Sig Dispense Refill     calcium-vitamin D (CALCIUM-VITAMIN D) 500 mg(1,250mg) -200 unit per tablet [CALCIUM-VITAMIN D (CALCIUM-VITAMIN D) 500 MG(1,250MG) -200 UNIT PER TABLET] Take 1 tablet by mouth daily.       EPINEPHrine (ANY BX GENERIC EQUIV) 0.3 MG/0.3ML injection 2-pack as needed        Lactobacillus rhamnosus GG (CULTURELLE) 10-15 Billion cell capsule [LACTOBACILLUS RHAMNOSUS GG (CULTURELLE) 10-15 BILLION CELL CAPSULE] Take 1 capsule by mouth daily.       Omega-3 Fatty Acids (FISH OIL PO) Take 1 capsule by mouth daily       psyllium (METAMUCIL) 3.4 gram packet [PSYLLIUM (METAMUCIL) 3.4 GRAM PACKET] Take 1 packet by mouth daily.       sotalol (BETAPACE) 160 MG tablet TAKE 1 TABLET(160 MG) BY MOUTH TWICE DAILY 180 tablet 1     VITAMIN D PO Take 5,000 Units by mouth       apixaban ANTICOAGULANT (ELIQUIS ANTICOAGULANT) 5 MG tablet Take 1 tablet (5 mg) by mouth 2 times daily ;  Start on 6-22-22, stop Aspirin (Patient not taking: Reported on 5/12/2022) 60 tablet 6    Allergies   Allergen Reactions     Bee Venom          Lab Results     Chemistry/lipid CBC Cardiac Enzymes/BNP/TSH/INR   No results found for: CHOL, HDL, TRIG, CHOLHDL, CREATININE, BUN, NA, CO2 No results found for: WBC, HGB, HCT, MCV, PLT No results found for: TROPONINI  No results found for: BNP, NTBNPI, NTBNP  No results found for: TSH  No results found for: INR     Total Time- 25 minutes spent on date of encounter doing chart review, history and exam, documentation and further activities as noted above.  This note has been dictated using voice recognition software. Any grammatical, typographical, or context distortions are unintentional and inherent to the software.    CARLEY Gonsalez [facility]      Thank you for allowing me to participate in the care of your patient.      Sincerely,     PRAMOD Gonsalez CNP [facility]  cc:   PRAMOD Gonsalez CNP  [address] [place] [address]

## 2022-06-15 ENCOUNTER — OFFICE VISIT (OUTPATIENT)
Dept: SLEEP MEDICINE | Facility: CLINIC | Age: 57
End: 2022-06-15
Payer: COMMERCIAL

## 2022-06-15 DIAGNOSIS — I48.0 PAROXYSMAL ATRIAL FIBRILLATION (H): ICD-10-CM

## 2022-06-15 DIAGNOSIS — R06.83 SNORING: ICD-10-CM

## 2022-06-15 PROCEDURE — G0399 HOME SLEEP TEST/TYPE 3 PORTA: HCPCS | Performed by: INTERNAL MEDICINE

## 2022-06-16 ENCOUNTER — DOCUMENTATION ONLY (OUTPATIENT)
Dept: SLEEP MEDICINE | Facility: CLINIC | Age: 57
End: 2022-06-16
Payer: COMMERCIAL

## 2022-06-20 NOTE — PROGRESS NOTES
This HSAT was performed using a Noxturnal T3 device which recorded snore, sound, movement activity, body position, nasal pressure, oronasal thermal airflow, pulse, oximetry and both chest and abdominal respiratory effort. HSAT data was restricted to the time patient states they were in bed.     HSAT was scored using 1B 4% hypopnea rule.     HST AHI (Non-PAT): 0.7  Snoring was reported as mild, moderate and loud.  Time with SpO2 below 89% was 0 minutes.   Overall signal quality was good     Pt will follow up with sleep provider to determine appropriate therapy.       HST POST-STUDY QUESTIONNAIRE    1. What time did you go to bed?  10:45 p.m.  2. How long do you think it took to fall asleep?  15 - 20 min  3. What time did you wake up to start the day?  6:45 a/m/  4. Did you get up during the night at all?  Yes - 2x  5. If you woke up, do you remember approximately what time(s)? No  6. Did you have any difficulty with the equipment?  No  7. Did you us any type of treatment with this study?  None  8. Was the head of the bed elevated? No  9. Did you sleep in a recliner?  No  10. Did you stop using CPAP at least 3 days before this test?  NA  11. Any other information you'd like us to know? No

## 2022-06-21 NOTE — PROCEDURES
HOME SLEEP STUDY INTERPRETATION        Patient: Vishal Cat  MRN: [id number]  YOB: 1965  Study Date: 6/15/2022  PCP/Referring Provider: Dionisio Doherty;   Ordering Provider: Bennett Goltz, PA-C         Indications for Home Study: Vishal Cat is a 56 year old male  who presents with symptoms suggestive of obstructive sleep apnea.    Estimated body mass index is 27.21 kg/m  as calculated from the following:    Height as of 3/10/22: 1.829 m (6').    Weight as of 5/12/22: 91 kg (200 lb 9.6 oz).  Total score - Currie: 3 (3/9/2022  2:00 PM)  STOP-BANG: 3/8        Data: A full night home sleep study was performed recording the standard physiologic parameters including body position, movement, sound, nasal pressure, thermal oral airflow, chest and abdominal movements with respiratory inductance plethysmography, and oxygen saturation by pulse oximetry. Pulse rate was estimated by oximetry recording. This study was considered adequate based on > 4 hours of quality oximetry and respiratory recording. As specified by the AASM Manual for the Scoring of Sleep and Associated events, version 2.3, Rule VIII.D 1B, 4% oxygen desaturation scoring for hypopneas is used as a standard of care on all home sleep apnea testing.        Analysis Time:  420 minutes        Respiration:   Sleep Associated Hypoxemia: sustained hypoxemia was not present. Baseline oxygen saturation was 96%.  Time with saturation less than or equal to 88% was 0 minutes. The lowest oxygen saturation was 88%.   Snoring: Snoring was present.  Respiratory events: The home study revealed a presence of 0 obstructive apneas and 0 mixed and central apneas. There were 5 hypopneas resulting in a combined apnea/hypopnea index [AHI] of 0.7 events per hour.  AHI was 0 per hour supine, 0 per hour prone, 0.5 per hour on left side, and 0.9 per hour on right side.   Pattern: Excluding events noted above, respiratory rate and pattern was Normal.      Position:  Percent of time spent: supine - 0.1%, prone - 6.4%, on left - 30.7%, on right - 61.7%.      Heart Rate: By pulse oximetry normal rate was noted.       Assessment:     Negative for sleep apnea.    Sleep associated hypoxemia was not present.    Recommendations:    If clinical concern for sleep disordered breathing remains due to history or exam findings, consider in lab PSG for assessment.        Diagnosis Code(s): Snoring R06.83    Francois Quiles MD, June 21, 2022   Diplomate, American Board of Psychiatry and Neurology, Sleep Medicine

## 2022-06-27 ASSESSMENT — SLEEP AND FATIGUE QUESTIONNAIRES
HOW LIKELY ARE YOU TO NOD OFF OR FALL ASLEEP WHILE LYING DOWN TO REST IN THE AFTERNOON WHEN CIRCUMSTANCES PERMIT: SLIGHT CHANCE OF DOZING
HOW LIKELY ARE YOU TO NOD OFF OR FALL ASLEEP WHILE SITTING INACTIVE IN A PUBLIC PLACE: WOULD NEVER DOZE
HOW LIKELY ARE YOU TO NOD OFF OR FALL ASLEEP WHILE SITTING QUIETLY AFTER LUNCH WITHOUT ALCOHOL: WOULD NEVER DOZE
HOW LIKELY ARE YOU TO NOD OFF OR FALL ASLEEP WHILE SITTING AND READING: WOULD NEVER DOZE
HOW LIKELY ARE YOU TO NOD OFF OR FALL ASLEEP WHILE WATCHING TV: SLIGHT CHANCE OF DOZING
HOW LIKELY ARE YOU TO NOD OFF OR FALL ASLEEP WHEN YOU ARE A PASSENGER IN A CAR FOR AN HOUR WITHOUT A BREAK: WOULD NEVER DOZE
HOW LIKELY ARE YOU TO NOD OFF OR FALL ASLEEP IN A CAR, WHILE STOPPED FOR A FEW MINUTES IN TRAFFIC: WOULD NEVER DOZE
HOW LIKELY ARE YOU TO NOD OFF OR FALL ASLEEP WHILE SITTING AND TALKING TO SOMEONE: WOULD NEVER DOZE

## 2022-06-29 ENCOUNTER — VIRTUAL VISIT (OUTPATIENT)
Dept: SLEEP MEDICINE | Facility: CLINIC | Age: 57
End: 2022-06-29
Payer: COMMERCIAL

## 2022-06-29 VITALS — WEIGHT: 195 LBS | BODY MASS INDEX: 26.41 KG/M2 | HEIGHT: 72 IN

## 2022-06-29 DIAGNOSIS — R06.83 SNORING: Primary | ICD-10-CM

## 2022-06-29 PROCEDURE — 99212 OFFICE O/P EST SF 10 MIN: CPT | Mod: 95 | Performed by: PHYSICIAN ASSISTANT

## 2022-06-29 NOTE — PROGRESS NOTES
Vishal is a 56 year old who is being evaluated via a billable video visit.      How would you like to obtain your AVS? MyChart  If the video visit is dropped, the invitation should be resent by: Send to e-mail at: [email or web address]  Will anyone else be joining your video visit? Janny Cortes        Video-Visit Details    Video Start Time: 10:50 AM    Type of service:  Video Visit    Video End Time:11:00 AM    Originating Location (pt. Location): Home    Distant Location (provider location):  [facility]     Platform used for Video Visit: NOBOT

## 2022-06-29 NOTE — PROGRESS NOTES
Sleep Follow-Up Visit:    Date on this visit: 6/29/2022    Vishal Cat comes in today for follow-up of his sleep study done on 6/15/22. Vishal Cat was initially seen for snoring and atrial fibrillation.     HST Results:  Weight: 200 pounds  Analysis Time:  420 minutes        Respiration:   Sleep Associated Hypoxemia: sustained hypoxemia was not present. Baseline oxygen saturation was 96%.  Time with saturation less than or equal to 88% was 0 minutes. The lowest oxygen saturation was 88%.   Snoring: Snoring was present.  Respiratory events: The home study revealed a presence of 0 obstructive apneas and 0 mixed and central apneas. There were 5 hypopneas resulting in a combined apnea/hypopnea index [AHI] of 0.7 events per hour.  AHI was 0 per hour supine, 0 per hour prone, 0.5 per hour on left side, and 0.9 per hour on right side.   Pattern: Excluding events noted above, respiratory rate and pattern was Normal.        Position: Percent of time spent: supine - 0.1%, prone - 6.4%, on left - 30.7%, on right - 61.7%.        Heart Rate: By pulse oximetry normal rate was noted.    He does not generally sleep supine at home.  He has compressed his sleep schedule and that helped a lot. He is in bed 11 PM to 6 AM. He wakes at 4 AM when his wife gets up for work, but he is able to get back to sleep better. He is happy with his sleep and is feeling better.     Past medical/surgical history, family history, social history, medications and allergies were reviewed.      Problem List:  Patient Active Problem List    Diagnosis Date Noted     Snoring 12/13/2021     Priority: Medium     Sleep study recommended 12/2021       Paroxysmal atrial fibrillation (H) 09/10/2015     Priority: Medium     Dx 2009  [id number] score = 0  Rx ASA 325mg + BB          Impression/Plan:    (R06.83) Snoring  (primary encounter diagnosis)  Comment: This study showed no significant apnea. He did not sleep supine, but does not sleep supine at home. He  is sleeping better with compressing his time in bed. His wife does not complain about his snoring.  Plan: No treatment is indicated. He was advised to pay attention to his sleeping position when he wakes. If he finds he ends up on his back more than he was aware, he could either try a positional restriction device or we could repeat a study with the goal of assessing his sleep in the supine position.     He will follow up with me in the future as needed.     15 minutes were spent on the date of the encounter doing chart review, history and exam, documentation and further activities as noted above.   Bennett Goltz, PA-C    CC: Akbar Ocamop MD

## 2022-07-13 ENCOUNTER — ALLIED HEALTH/NURSE VISIT (OUTPATIENT)
Dept: CARDIOLOGY | Facility: CLINIC | Age: 57
End: 2022-07-13

## 2022-07-13 ENCOUNTER — PREP FOR PROCEDURE (OUTPATIENT)
Dept: CARDIOLOGY | Facility: CLINIC | Age: 57
End: 2022-07-13

## 2022-07-13 ENCOUNTER — OFFICE VISIT (OUTPATIENT)
Dept: CARDIOLOGY | Facility: CLINIC | Age: 57
End: 2022-07-13

## 2022-07-13 ENCOUNTER — LAB (OUTPATIENT)
Dept: CARDIOLOGY | Facility: CLINIC | Age: 57
End: 2022-07-13
Payer: COMMERCIAL

## 2022-07-13 VITALS
SYSTOLIC BLOOD PRESSURE: 98 MMHG | HEART RATE: 74 BPM | BODY MASS INDEX: 26.14 KG/M2 | HEIGHT: 72 IN | WEIGHT: 193 LBS | DIASTOLIC BLOOD PRESSURE: 60 MMHG | RESPIRATION RATE: 20 BRPM

## 2022-07-13 DIAGNOSIS — R06.83 SNORING: ICD-10-CM

## 2022-07-13 DIAGNOSIS — I48.0 PAROXYSMAL ATRIAL FIBRILLATION (H): ICD-10-CM

## 2022-07-13 DIAGNOSIS — I48.0 PAROXYSMAL ATRIAL FIBRILLATION (H): Primary | ICD-10-CM

## 2022-07-13 LAB
ANION GAP SERPL CALCULATED.3IONS-SCNC: 10 MMOL/L (ref 5–18)
ATRIAL RATE - MUSE: 73 BPM
BUN SERPL-MCNC: 9 MG/DL (ref 8–22)
CALCIUM SERPL-MCNC: 9.9 MG/DL (ref 8.5–10.5)
CHLORIDE BLD-SCNC: 96 MMOL/L (ref 98–107)
CO2 SERPL-SCNC: 29 MMOL/L (ref 22–31)
CREAT SERPL-MCNC: 0.84 MG/DL (ref 0.7–1.3)
DIASTOLIC BLOOD PRESSURE - MUSE: NORMAL MMHG
GFR SERPL CREATININE-BSD FRML MDRD: >90 ML/MIN/1.73M2
GLUCOSE BLD-MCNC: 122 MG/DL (ref 70–125)
INTERPRETATION ECG - MUSE: NORMAL
P AXIS - MUSE: 69 DEGREES
POTASSIUM BLD-SCNC: 4.6 MMOL/L (ref 3.5–5)
PR INTERVAL - MUSE: 156 MS
QRS DURATION - MUSE: 86 MS
QT - MUSE: 426 MS
QTC - MUSE: 469 MS
R AXIS - MUSE: 17 DEGREES
SODIUM SERPL-SCNC: 135 MMOL/L (ref 136–145)
SYSTOLIC BLOOD PRESSURE - MUSE: NORMAL MMHG
T AXIS - MUSE: 79 DEGREES
VENTRICULAR RATE- MUSE: 73 BPM

## 2022-07-13 PROCEDURE — 36415 COLL VENOUS BLD VENIPUNCTURE: CPT

## 2022-07-13 PROCEDURE — 99215 OFFICE O/P EST HI 40 MIN: CPT | Performed by: NURSE PRACTITIONER

## 2022-07-13 PROCEDURE — 93000 ELECTROCARDIOGRAM COMPLETE: CPT | Performed by: GENERAL ACUTE CARE HOSPITAL

## 2022-07-13 PROCEDURE — 99207 PR NO CHARGE NURSE ONLY: CPT

## 2022-07-13 PROCEDURE — 85027 COMPLETE CBC AUTOMATED: CPT

## 2022-07-13 PROCEDURE — 80048 BASIC METABOLIC PNL TOTAL CA: CPT

## 2022-07-13 RX ORDER — SOTALOL HYDROCHLORIDE 160 MG/1
TABLET ORAL
Qty: 180 TABLET | Refills: 1 | Status: ON HOLD
Start: 2022-07-13 | End: 2022-07-20

## 2022-07-13 RX ORDER — METOPROLOL TARTRATE 100 MG
100 TABLET ORAL 2 TIMES DAILY
Qty: 60 TABLET | Refills: 3 | Status: SHIPPED | OUTPATIENT
Start: 2022-07-13 | End: 2022-09-14

## 2022-07-13 RX ORDER — PANTOPRAZOLE SODIUM 40 MG/1
TABLET, DELAYED RELEASE ORAL
Qty: 45 TABLET | Refills: 0 | Status: SHIPPED | OUTPATIENT
Start: 2022-07-13 | End: 2022-08-25

## 2022-07-13 RX ORDER — LIDOCAINE 40 MG/G
CREAM TOPICAL
Status: CANCELLED | OUTPATIENT
Start: 2022-07-13

## 2022-07-13 NOTE — LETTER
7/13/2022    JOHANN GRIFFITH  [facility] [address]  [place] [address]    RE: Vishal Cat       Dear Colleague,     I had the pleasure of seeing Vishal Cat in the [facility].    Thank you, Dr. Griffith, for asking the [facility] team to see Mr. Vishal Cat to evaluate paroxysmal atrial fibrillation.    Assessment/Recommendations     Assessment/Plan:    Diagnoses and all orders for this visit:  Paroxysmal atrial fibrillation (H) and directly symptomatic with atrial fibrillation.  He is on high-dose sotalol and 3 days prior will stop sotalol and switch him to metoprolol tartrate 100 mg p.o. twice daily.  To question before he leaves the hospital after ablation whether he should be on sotalol or metoprolol.  I plan to wean beta-blocker when I see him back in clinic.   I reviewed what to expect with ablation, postprocedure recovery and signs and symptoms to watch for and call us with post ablation.  See AVS for more details.  I started him on Protonix protocol.  Snoring and reviewed sleep study from June which was negative for JANET      [id number] score of 0 and on Eliquis since June 22.  He missed 1 dose yesterday.  Cautioned not to miss any additional doses of Eliquis before and further especially the month after procedure.  To take Eliquis as the only medication the morning of procedure.  Follow up in clinic with me in 7 to 8 weeks.     History of Present Illness/Subjective     Vishal Cat is a very pleasant 56 year old male who comes in today for history and physical prior to pulmonary vein isolation ablation with Dr. Ocampo on July 20, 2022.  Vishal Cat has a known history of atrial fibrillation with increasing frequency and length of episodes.  His symptoms of atrial fibrillation include warm feeling in chest, palpitations, fatigue, lightheadedness.  He was started on low-dose sotalol and dose has been increased to high-dose to try to control his  atrial fibrillation.  Vishal reports that he has had only 3 episodes of atrial fibrillation that he is aware of since he increased his sotalol to 160 mg p.o. twice daily.  He feels fatigued on this medicine.  He is having problems with vertigo since general anesthesia with bowel resection.  He has a history of snoring.  He did a recent sleep study.  See above.    This visit will serve as history and physical for ablation.  See problem list for more details.  Medical, past medical, surgical and social history reviewed and updated.  Meds and allergies reviewed.   No personal or family history of adverse reactions to anesthesia or abnormal bleeding with surgery.  He has had only 1 surgery which required general anesthesia and told that he was hard to intubate.        Cardiographics (reviewed):    ECHO 1/20/2022 Interpretation Summary     The left ventricle is normal in size.  The visual ejection fraction is 65-70%.  Normal left ventricular wall motion  The right ventricle is normal in size and function.  The right ventricle is not well visualized.  IVC diameter and respiratory changes fall into an intermediate range  suggesting an RA pressure of 8 mmHg.  The rhythm was atrial flutter.  Rapid ventricular response     Cardiac testing personally reviewed:  INTERPRETATION Holter monitoring 3/4/2022 to 3/5/2022 (24hrs monitored) Predominant rhythm was sinus rhythm. Paroxysmal atrial fibrillation and atrial flutter accounting for 13% of the monitored interval. Heart rate range 56-165bpm, average 89bpm. 2 episodes of nonsustained atrial tachycardia, longest lasting 6 beats, fastest 189bpm. 3 episodes of nonsustained ventricular tachycardia, longest 6 beats, fastest 207bpm. These occurred during atrial fibrillation with rapid ventricular rates, and may partially be related to aberrancy. No pauses of over 3.0s. Rare supraventricular ectopy (0%). Rare ventricular ectopy (0%). Symptoms of palpitations correlated to sinus rhythm  with supraventricular ectopy. IMPRESSION Sinus rhythm with paroxysmal atrial fibrillation and atrial flutter with associated rapid ventricular rates. Intervals of nonsustained wide complex tachycardia seen during atrial fibrillation with rapid ventricular rates may represent aberrancy vs nonsustained ventricular tachycardia.     Twelve-lead ECG today shows normal sinus rhythm of 73 with 1 PAC and QTC of 469 ms.     Problem List:  Patient Active Problem List   Diagnosis     Paroxysmal atrial fibrillation (H)     Snoring     Revi  e  Physical Examination Review of Systems   w [facility]  There were no vitals taken for this visit.  There is no height or weight on file to calculate BMI.  Wt Readings from Last 3 Encounters:   06/29/22 88.5 kg (195 lb)   05/12/22 91 kg (200 lb 9.6 oz)   03/10/22 92 kg (202 lb 14.4 oz)     General Appearance:   Alert, well-appearing and in no acute distress.   HEENT: Atraumatic, normocephalic.  No scleral icterus, normal conjunctivae; mucous membranes pink and moist.     Chest: Chest symmetric, spine straight.   Lungs:   Respirations unlabored: Lungs are clear to auscultation.   Cardiovascular:   Normal first and second heart sounds with no murmurs, rubs, or gallops.  Regular, regular.   Normal JVD, no edema.       Extremities: No cyanosis or clubbing   Musculoskeletal: Moves all extremities   Skin: Warm, dry, intact.    Neurologic: Mood and affect are appropriate, alert and oriented to person, place, time, and situation     ROS: 10 point ROS neg other than the symptoms noted above in the HPI.     Medical History  Surgical History Family History Social History     Past Medical History:   Diagnosis Date     Atrial fibrillation (H)      Snoring 12/13/2021    Sleep study recommended 12/2021    No past surgical history on file. Family History   Problem Relation Age of Onset     Diabetes Mother      Diabetes Maternal Grandfather      Sleep Apnea Brother      Obesity Brother      Pacemaker Paternal  Grandfather     History   Smoking Status     Current Every Day Smoker     Packs/day: 1.00     Types: Cigarettes   Smokeless Tobacco     Never Used     Comment: smoked for about as long as he can remember, mid teens     Social History    Substance and Sexual Activity      Alcohol use: Yes        Comment: few drinks nightly after dinner       Medications  Allergies     Current Outpatient Medications   Medication Sig Dispense Refill     apixaban ANTICOAGULANT (ELIQUIS ANTICOAGULANT) 5 MG tablet Take 1 tablet (5 mg) by mouth 2 times daily ;  Start on 6-22-22, stop Aspirin (Patient not taking: Reported on 5/12/2022) 60 tablet 6     calcium-vitamin D (CALCIUM-VITAMIN D) 500 mg(1,250mg) -200 unit per tablet [CALCIUM-VITAMIN D (CALCIUM-VITAMIN D) 500 MG(1,250MG) -200 UNIT PER TABLET] Take 1 tablet by mouth daily.       EPINEPHrine (ANY BX GENERIC EQUIV) 0.3 MG/0.3ML injection 2-pack as needed        Lactobacillus rhamnosus GG (CULTURELLE) 10-15 Billion cell capsule [LACTOBACILLUS RHAMNOSUS GG (CULTURELLE) 10-15 BILLION CELL CAPSULE] Take 1 capsule by mouth daily.       Omega-3 Fatty Acids (FISH OIL PO) Take 1 capsule by mouth daily       psyllium (METAMUCIL) 3.4 gram packet [PSYLLIUM (METAMUCIL) 3.4 GRAM PACKET] Take 1 packet by mouth daily.       sotalol (BETAPACE) 160 MG tablet TAKE 1 TABLET(160 MG) BY MOUTH TWICE DAILY 180 tablet 1     VITAMIN D PO Take 5,000 Units by mouth        Allergies   Allergen Reactions     Bee Venom       Medical, surgical, family, social history, and medications were all reviewed and updated as necessary.   Lab Results    Chemistry/lipid CBC Cardiac Enzymes/BNP/TSH/INR   No results for input(s): CHOL, HDL, LDL, TRIG, CHOLHDLRATIO in the last 86710 hours.  No results for input(s): LDL in the last 60528 hours.  No results for input(s): NA, POTASSIUM, CHLORIDE, CO2, GLC, BUN, CR, GFRESTIMATED, FANNY in the last 20754 hours.    Invalid input(s): GRFESTBLACK  No results for input(s): CR in the last  22523 hours.  No results for input(s): A1C in the last 62863 hours.       No results for input(s): WBC, HGB, HCT, MCV, PLT in the last 60503 hours.  No results for input(s): HGB in the last 77509 hours. No results for input(s): TROPONINI in the last 19847 hours.  No results for input(s): BNP, NTBNPI, NTBNP in the last 30847 hours.  No results for input(s): TSH in the last 65888 hours.  No results for input(s): INR in the last 18101 hours.     Total Time- 45 minutes spent on date of encounter doing chart review, history and exam, documentation and further activities as noted above.  This note has been dictated using voice recognition software. Any grammatical, typographical, or context distortions are unintentional and inherent to the software.      Thank you for allowing me to participate in the care of your patient.      Sincerely,     PRAMOD Hernández [facility]  cc: No referring provider defined for this encounter.

## 2022-07-13 NOTE — H&P (VIEW-ONLY)
Thank you, Dr. Doherty, for asking the [facility] team to see . Vishal Cat to evaluate paroxysmal atrial fibrillation.    Assessment/Recommendations     Assessment/Plan:    Diagnoses and all orders for this visit:  Paroxysmal atrial fibrillation (H) and directly symptomatic with atrial fibrillation.  He is on high-dose sotalol and 3 days prior will stop sotalol and switch him to metoprolol tartrate 100 mg p.o. twice daily.  To question before he leaves the hospital after ablation whether he should be on sotalol or metoprolol.  I plan to wean beta-blocker when I see him back in clinic.   I reviewed what to expect with ablation, postprocedure recovery and signs and symptoms to watch for and call us with post ablation.  See AVS for more details.  I started him on Protonix protocol.  Snoring and reviewed sleep study from June which was negative for JANET      [id number] score of 0 and on Eliquis since June 22.  He missed 1 dose yesterday.  Cautioned not to miss any additional doses of Eliquis before and further especially the month after procedure.  To take Eliquis as the only medication the morning of procedure.  Follow up in clinic with me in 7 to 8 weeks.     History of Present Illness/Subjective     Vishal Cat is a very pleasant 56 year old male who comes in today for history and physical prior to pulmonary vein isolation ablation with Dr. Ocampo on July 20, 2022.  Vishal Cat has a known history of atrial fibrillation with increasing frequency and length of episodes.  His symptoms of atrial fibrillation include warm feeling in chest, palpitations, fatigue, lightheadedness.  He was started on low-dose sotalol and dose has been increased to high-dose to try to control his atrial fibrillation.  Vishal reports that he has had only 3 episodes of atrial fibrillation that he is aware of since he increased his sotalol to 160 mg p.o. twice daily.  He feels fatigued on this medicine.  He is having  problems with vertigo since general anesthesia with bowel resection.  He has a history of snoring.  He did a recent sleep study.  See above.    This visit will serve as history and physical for ablation.  See problem list for more details.  Medical, past medical, surgical and social history reviewed and updated.  Meds and allergies reviewed.   No personal or family history of adverse reactions to anesthesia or abnormal bleeding with surgery.  He has had only 1 surgery which required general anesthesia and told that he was hard to intubate.        Cardiographics (reviewed):    ECHO 1/20/2022 Interpretation Summary     The left ventricle is normal in size.  The visual ejection fraction is 65-70%.  Normal left ventricular wall motion  The right ventricle is normal in size and function.  The right ventricle is not well visualized.  IVC diameter and respiratory changes fall into an intermediate range  suggesting an RA pressure of 8 mmHg.  The rhythm was atrial flutter.  Rapid ventricular response     Cardiac testing personally reviewed:  INTERPRETATION Holter monitoring 3/4/2022 to 3/5/2022 (24hrs monitored) Predominant rhythm was sinus rhythm. Paroxysmal atrial fibrillation and atrial flutter accounting for 13% of the monitored interval. Heart rate range 56-165bpm, average 89bpm. 2 episodes of nonsustained atrial tachycardia, longest lasting 6 beats, fastest 189bpm. 3 episodes of nonsustained ventricular tachycardia, longest 6 beats, fastest 207bpm. These occurred during atrial fibrillation with rapid ventricular rates, and may partially be related to aberrancy. No pauses of over 3.0s. Rare supraventricular ectopy (0%). Rare ventricular ectopy (0%). Symptoms of palpitations correlated to sinus rhythm with supraventricular ectopy. IMPRESSION Sinus rhythm with paroxysmal atrial fibrillation and atrial flutter with associated rapid ventricular rates. Intervals of nonsustained wide complex tachycardia seen during atrial  fibrillation with rapid ventricular rates may represent aberrancy vs nonsustained ventricular tachycardia.     Twelve-lead ECG today shows normal sinus rhythm of 73 with 1 PAC and QTC of 469 ms.     Problem List:  Patient Active Problem List   Diagnosis     Paroxysmal atrial fibrillation (H)     Snoring     Revi  e  Physical Examination Review of Systems   w stems  There were no vitals taken for this visit.  There is no height or weight on file to calculate BMI.  Wt Readings from Last 3 Encounters:   06/29/22 88.5 kg (195 lb)   05/12/22 91 kg (200 lb 9.6 oz)   03/10/22 92 kg (202 lb 14.4 oz)     General Appearance:   Alert, well-appearing and in no acute distress.   HEENT: Atraumatic, normocephalic.  No scleral icterus, normal conjunctivae; mucous membranes pink and moist.     Chest: Chest symmetric, spine straight.   Lungs:   Respirations unlabored: Lungs are clear to auscultation.   Cardiovascular:   Normal first and second heart sounds with no murmurs, rubs, or gallops.  Regular, regular.   Normal JVD, no edema.       Extremities: No cyanosis or clubbing   Musculoskeletal: Moves all extremities   Skin: Warm, dry, intact.    Neurologic: Mood and affect are appropriate, alert and oriented to person, place, time, and situation     ROS: 10 point ROS neg other than the symptoms noted above in the HPI.     Medical History  Surgical History Family History Social History     Past Medical History:   Diagnosis Date     Atrial fibrillation (H)      Snoring 12/13/2021    Sleep study recommended 12/2021    No past surgical history on file. Family History   Problem Relation Age of Onset     Diabetes Mother      Diabetes Maternal Grandfather      Sleep Apnea Brother      Obesity Brother      Pacemaker Paternal Grandfather     History   Smoking Status     Current Every Day Smoker     Packs/day: 1.00     Types: Cigarettes   Smokeless Tobacco     Never Used     Comment: smoked for about as long as he can remember, mid teens      Social History    Substance and Sexual Activity      Alcohol use: Yes        Comment: few drinks nightly after dinner       Medications  Allergies     Current Outpatient Medications   Medication Sig Dispense Refill     apixaban ANTICOAGULANT (ELIQUIS ANTICOAGULANT) 5 MG tablet Take 1 tablet (5 mg) by mouth 2 times daily ;  Start on 6-22-22, stop Aspirin (Patient not taking: Reported on 5/12/2022) 60 tablet 6     calcium-vitamin D (CALCIUM-VITAMIN D) 500 mg(1,250mg) -200 unit per tablet [CALCIUM-VITAMIN D (CALCIUM-VITAMIN D) 500 MG(1,250MG) -200 UNIT PER TABLET] Take 1 tablet by mouth daily.       EPINEPHrine (ANY BX GENERIC EQUIV) 0.3 MG/0.3ML injection 2-pack as needed        Lactobacillus rhamnosus GG (CULTURELLE) 10-15 Billion cell capsule [LACTOBACILLUS RHAMNOSUS GG (CULTURELLE) 10-15 BILLION CELL CAPSULE] Take 1 capsule by mouth daily.       Omega-3 Fatty Acids (FISH OIL PO) Take 1 capsule by mouth daily       psyllium (METAMUCIL) 3.4 gram packet [PSYLLIUM (METAMUCIL) 3.4 GRAM PACKET] Take 1 packet by mouth daily.       sotalol (BETAPACE) 160 MG tablet TAKE 1 TABLET(160 MG) BY MOUTH TWICE DAILY 180 tablet 1     VITAMIN D PO Take 5,000 Units by mouth        Allergies   Allergen Reactions     Bee Venom       Medical, surgical, family, social history, and medications were all reviewed and updated as necessary.   Lab Results    Chemistry/lipid CBC Cardiac Enzymes/BNP/TSH/INR   No results for input(s): CHOL, HDL, LDL, TRIG, CHOLHDLRATIO in the last 06880 hours.  No results for input(s): LDL in the last 26455 hours.  No results for input(s): NA, POTASSIUM, CHLORIDE, CO2, GLC, BUN, CR, GFRESTIMATED, FANNY in the last 11940 hours.    Invalid input(s): GRFESTBLACK  No results for input(s): CR in the last 24219 hours.  No results for input(s): A1C in the last 71106 hours.       No results for input(s): WBC, HGB, HCT, MCV, PLT in the last 30427 hours.  No results for input(s): HGB in the last 50798 hours. No results for  input(s): TROPONINI in the last 22098 hours.  No results for input(s): BNP, NTBNPI, NTBNP in the last 56873 hours.  No results for input(s): TSH in the last 04791 hours.  No results for input(s): INR in the last 71889 hours.       Total Time- 45 minutes spent on date of encounter doing chart review, history and exam, documentation and further activities as noted above.  This note has been dictated using voice recognition software. Any grammatical, typographical, or context distortions are unintentional and inherent to the software.

## 2022-07-13 NOTE — Clinical Note
James Ocampo!  FYI: Pt has scheduled PVI with you on 7-20. He has a [id number] score of 0 and on Eliquis since 6-22-22. Pt stated today that he missed one dose of his Eliquis yesterday 7-12. No afib noted by pt since he started Eliquis, EKG at his pre-op physical today shows SR.  Thanks, Reece

## 2022-07-13 NOTE — PATIENT INSTRUCTIONS
Vishaldipti Cat,    It was a pleasure to see you today at the [facility].     My recommendations after this visit include:    Eliquis is the only medication you should take the morning of your ablation.    To stop sotalol 3 days before PVI.  Your last dose of sotalol is on the evening of July 16.  July 17 to start metoprolol tartrate 100 mg orally twice a day in place of sotalol for heart rate control if afib or flutter prior to or after ablation.  Before you leave the hospital after ablation ask if you should be on the sotalol or metoprolol.  Dr. Ocampo will make this decision after your ablation.  July 17 start pantoprazole 40 mg orally once a day 3 days before ablation and continue for 6 weeks after ablation.      You will have a post op phone visit on: July 22    After PVI:  1.  It is important to continue your blood thinner of Eliquis before and after procedure and important not to miss any tablets to prevent a stroke.    2.  Please call if you are frequently out of rhythm or episodes are longer than 6 hours.  Also call if you have ankle, facial or hand swelling noted after procedure.  It is not uncommon to have some chest discomfort the first week but if this continues or reoccurs after the first week, please call.  Call if you have a fever, difficulty with heartburn, throat issues beyond the first week or trouble swallowing or tolerating food.      3.  Also call if any abnormal symptoms for you in the first 4 weeks post ablation.        My contact information:  Cirilo Parker CNP  After Hours or Scheduling  [phone]  Dr. Ocampo's  Nurse---Maria Elena Murray RN etc. [phone]

## 2022-07-13 NOTE — PROGRESS NOTES
Thank you, Dr. Doherty, for asking the [facility] team to see . Vishal Cat to evaluate paroxysmal atrial fibrillation.    Assessment/Recommendations     Assessment/Plan:    Diagnoses and all orders for this visit:  Paroxysmal atrial fibrillation (H) and directly symptomatic with atrial fibrillation.  He is on high-dose sotalol and 3 days prior will stop sotalol and switch him to metoprolol tartrate 100 mg p.o. twice daily.  To question before he leaves the hospital after ablation whether he should be on sotalol or metoprolol.  I plan to wean beta-blocker when I see him back in clinic.   I reviewed what to expect with ablation, postprocedure recovery and signs and symptoms to watch for and call us with post ablation.  See AVS for more details.  I started him on Protonix protocol.  Snoring and reviewed sleep study from June which was negative for JANET      [id number] score of 0 and on Eliquis since June 22.  He missed 1 dose yesterday.  Cautioned not to miss any additional doses of Eliquis before and further especially the month after procedure.  To take Eliquis as the only medication the morning of procedure.  Follow up in clinic with me in 7 to 8 weeks.     History of Present Illness/Subjective     Vishal Cat is a very pleasant 56 year old male who comes in today for history and physical prior to pulmonary vein isolation ablation with Dr. Ocampo on July 20, 2022.  Vishal Cat has a known history of atrial fibrillation with increasing frequency and length of episodes.  His symptoms of atrial fibrillation include warm feeling in chest, palpitations, fatigue, lightheadedness.  He was started on low-dose sotalol and dose has been increased to high-dose to try to control his atrial fibrillation.  Vishal reports that he has had only 3 episodes of atrial fibrillation that he is aware of since he increased his sotalol to 160 mg p.o. twice daily.  He feels fatigued on this medicine.  He is having  problems with vertigo since general anesthesia with bowel resection.  He has a history of snoring.  He did a recent sleep study.  See above.    This visit will serve as history and physical for ablation.  See problem list for more details.  Medical, past medical, surgical and social history reviewed and updated.  Meds and allergies reviewed.   No personal or family history of adverse reactions to anesthesia or abnormal bleeding with surgery.  He has had only 1 surgery which required general anesthesia and told that he was hard to intubate.        Cardiographics (reviewed):    ECHO 1/20/2022 Interpretation Summary     The left ventricle is normal in size.  The visual ejection fraction is 65-70%.  Normal left ventricular wall motion  The right ventricle is normal in size and function.  The right ventricle is not well visualized.  IVC diameter and respiratory changes fall into an intermediate range  suggesting an RA pressure of 8 mmHg.  The rhythm was atrial flutter.  Rapid ventricular response     Cardiac testing personally reviewed:  INTERPRETATION Holter monitoring 3/4/2022 to 3/5/2022 (24hrs monitored) Predominant rhythm was sinus rhythm. Paroxysmal atrial fibrillation and atrial flutter accounting for 13% of the monitored interval. Heart rate range 56-165bpm, average 89bpm. 2 episodes of nonsustained atrial tachycardia, longest lasting 6 beats, fastest 189bpm. 3 episodes of nonsustained ventricular tachycardia, longest 6 beats, fastest 207bpm. These occurred during atrial fibrillation with rapid ventricular rates, and may partially be related to aberrancy. No pauses of over 3.0s. Rare supraventricular ectopy (0%). Rare ventricular ectopy (0%). Symptoms of palpitations correlated to sinus rhythm with supraventricular ectopy. IMPRESSION Sinus rhythm with paroxysmal atrial fibrillation and atrial flutter with associated rapid ventricular rates. Intervals of nonsustained wide complex tachycardia seen during atrial  fibrillation with rapid ventricular rates may represent aberrancy vs nonsustained ventricular tachycardia.     Twelve-lead ECG today shows normal sinus rhythm of 73 with 1 PAC and QTC of 469 ms.     Problem List:  Patient Active Problem List   Diagnosis     Paroxysmal atrial fibrillation (H)     Snoring     Revi  e  Physical Examination Review of Systems   w stems  There were no vitals taken for this visit.  There is no height or weight on file to calculate BMI.  Wt Readings from Last 3 Encounters:   06/29/22 88.5 kg (195 lb)   05/12/22 91 kg (200 lb 9.6 oz)   03/10/22 92 kg (202 lb 14.4 oz)     General Appearance:   Alert, well-appearing and in no acute distress.   HEENT: Atraumatic, normocephalic.  No scleral icterus, normal conjunctivae; mucous membranes pink and moist.     Chest: Chest symmetric, spine straight.   Lungs:   Respirations unlabored: Lungs are clear to auscultation.   Cardiovascular:   Normal first and second heart sounds with no murmurs, rubs, or gallops.  Regular, regular.   Normal JVD, no edema.       Extremities: No cyanosis or clubbing   Musculoskeletal: Moves all extremities   Skin: Warm, dry, intact.    Neurologic: Mood and affect are appropriate, alert and oriented to person, place, time, and situation     ROS: 10 point ROS neg other than the symptoms noted above in the HPI.     Medical History  Surgical History Family History Social History     Past Medical History:   Diagnosis Date     Atrial fibrillation (H)      Snoring 12/13/2021    Sleep study recommended 12/2021    No past surgical history on file. Family History   Problem Relation Age of Onset     Diabetes Mother      Diabetes Maternal Grandfather      Sleep Apnea Brother      Obesity Brother      Pacemaker Paternal Grandfather     History   Smoking Status     Current Every Day Smoker     Packs/day: 1.00     Types: Cigarettes   Smokeless Tobacco     Never Used     Comment: smoked for about as long as he can remember, mid teens      Social History    Substance and Sexual Activity      Alcohol use: Yes        Comment: few drinks nightly after dinner       Medications  Allergies     Current Outpatient Medications   Medication Sig Dispense Refill     apixaban ANTICOAGULANT (ELIQUIS ANTICOAGULANT) 5 MG tablet Take 1 tablet (5 mg) by mouth 2 times daily ;  Start on 6-22-22, stop Aspirin (Patient not taking: Reported on 5/12/2022) 60 tablet 6     calcium-vitamin D (CALCIUM-VITAMIN D) 500 mg(1,250mg) -200 unit per tablet [CALCIUM-VITAMIN D (CALCIUM-VITAMIN D) 500 MG(1,250MG) -200 UNIT PER TABLET] Take 1 tablet by mouth daily.       EPINEPHrine (ANY BX GENERIC EQUIV) 0.3 MG/0.3ML injection 2-pack as needed        Lactobacillus rhamnosus GG (CULTURELLE) 10-15 Billion cell capsule [LACTOBACILLUS RHAMNOSUS GG (CULTURELLE) 10-15 BILLION CELL CAPSULE] Take 1 capsule by mouth daily.       Omega-3 Fatty Acids (FISH OIL PO) Take 1 capsule by mouth daily       psyllium (METAMUCIL) 3.4 gram packet [PSYLLIUM (METAMUCIL) 3.4 GRAM PACKET] Take 1 packet by mouth daily.       sotalol (BETAPACE) 160 MG tablet TAKE 1 TABLET(160 MG) BY MOUTH TWICE DAILY 180 tablet 1     VITAMIN D PO Take 5,000 Units by mouth        Allergies   Allergen Reactions     Bee Venom       Medical, surgical, family, social history, and medications were all reviewed and updated as necessary.   Lab Results    Chemistry/lipid CBC Cardiac Enzymes/BNP/TSH/INR   No results for input(s): CHOL, HDL, LDL, TRIG, CHOLHDLRATIO in the last 93900 hours.  No results for input(s): LDL in the last 28214 hours.  No results for input(s): NA, POTASSIUM, CHLORIDE, CO2, GLC, BUN, CR, GFRESTIMATED, FANNY in the last 60265 hours.    Invalid input(s): GRFESTBLACK  No results for input(s): CR in the last 45552 hours.  No results for input(s): A1C in the last 85533 hours.       No results for input(s): WBC, HGB, HCT, MCV, PLT in the last 53501 hours.  No results for input(s): HGB in the last 86352 hours. No results for  input(s): TROPONINI in the last 22195 hours.  No results for input(s): BNP, NTBNPI, NTBNP in the last 55157 hours.  No results for input(s): TSH in the last 50786 hours.  No results for input(s): INR in the last 30857 hours.       Total Time- 45 minutes spent on date of encounter doing chart review, history and exam, documentation and further activities as noted above.  This note has been dictated using voice recognition software. Any grammatical, typographical, or context distortions are unintentional and inherent to the software.

## 2022-07-13 NOTE — PROGRESS NOTES
Pulmonary Vein Isolation Ablation Patient Education Visit:    Patient present for discussion.  Education completed Face to Face in the clinic on 7/13/2022 with Reece Encarnacion RN.  Please refer to documented H&P completed by EP provider in [facility] associated to this visit.    Procedural Discussion:  Reviewed pre and post op PVI procedure with pt, pre-procedure letter reviewed and given to pt- see letter in EPIC under documentation, see additional EP PVI prep note for details on procedure/prep, answered pt questions and concerns regarding procedure and pt denies issues with montoya placement in the past    Medication Discussion:  Anticoagulation- Reviewed importance of continuing anticoagulation uninterrupted pre and post ablation. Pt started Eliquis 5 mg twice daily on 6-22-22 as previously instructed. Pt stated he missed one dose of Eliquis yesterday 7-12-22, message sent to Dr. Ocampo for awareness.     PPI- instructions given to start Protonix 40mg Daily 3 days prior to PVI, to continue for 6 weeks post PVI then stop, and RX was sent to requested pharmacy. Pt instructed to start taking Protonix on Sunday 7-17-22.    Antiarrythmic- Pt instructed to stop Sotalol 3 days prior to PVI on Sunday 7-17-22. Instructed pt his last dose of Sotalol will be the evening of Saturday 7-16.  In addition, pt instructed to start taking Metoprolol tartrate 100 mg twice daily on 7-17-22.    7/13/2022 2:17 PM  Reece Encarnacion RN

## 2022-07-14 LAB
ERYTHROCYTE [DISTWIDTH] IN BLOOD BY AUTOMATED COUNT: 18.4 % (ref 10–15)
HCT VFR BLD AUTO: 47.8 % (ref 40–53)
HGB BLD-MCNC: 15.3 G/DL (ref 13.3–17.7)
MCH RBC QN AUTO: 21.9 PG (ref 26.5–33)
MCHC RBC AUTO-ENTMCNC: 32 G/DL (ref 31.5–36.5)
MCV RBC AUTO: 69 FL (ref 78–100)
PLATELET # BLD AUTO: 195 10E3/UL (ref 150–450)
RBC # BLD AUTO: 6.98 10E6/UL (ref 4.4–5.9)
WBC # BLD AUTO: 8.9 10E3/UL (ref 4–11)

## 2022-07-19 ENCOUNTER — ANESTHESIA EVENT (OUTPATIENT)
Dept: CARDIOLOGY | Facility: CLINIC | Age: 57
End: 2022-07-19
Payer: COMMERCIAL

## 2022-07-19 ASSESSMENT — ENCOUNTER SYMPTOMS: DYSRHYTHMIAS: 1

## 2022-07-19 ASSESSMENT — LIFESTYLE VARIABLES: TOBACCO_USE: 1

## 2022-07-19 NOTE — ANESTHESIA PREPROCEDURE EVALUATION
Anesthesia Pre-Procedure Evaluation    Patient: Vishal Cat   MRN: [id number] : 1965        Procedure : Procedure(s):  Ablation Atrial Fibrilation          Past Medical History:   Diagnosis Date     Atrial fibrillation (H)      Snoring 2021    Sleep study recommended 2021      Past Surgical History:   Procedure Laterality Date     SIGMOIDECTOMY N/A       Allergies   Allergen Reactions     Bee Venom       Social History     Tobacco Use     Smoking status: Current Every Day Smoker     Packs/day: 1.00     Types: Cigarettes     Smokeless tobacco: Never Used     Tobacco comment: smoked for about as long as he can remember, mid teens   Substance Use Topics     Alcohol use: Yes     Comment: few drinks nightly after dinner      Wt Readings from Last 1 Encounters:   22 87.5 kg (193 lb)      Echo   Interpretation Summary     The left ventricle is normal in size.  The visual ejection fraction is 65-70%.  Normal left ventricular wall motion  The right ventricle is normal in size and function.  The right ventricle is not well visualized.  IVC diameter and respiratory changes fall into an intermediate range  suggesting an RA pressure of 8 mmHg.  The rhythm was atrial flutter.  Rapid ventricular response  ______________________________________________________________________________  Left Ventricle  The left ventricle is normal in size. There is mild concentric left  ventricular hypertrophy. The visual ejection fraction is 65-70%. Normal left  ventricular wall motion.     Right Ventricle  The right ventricle is not well visualized. The right ventricle is normal in  size and function.     Atria  The left atrium is mildly dilated. The right atrium is mild to moderately  dilated.     Mitral Valve  The mitral valve leaflets are mildly thickened. There is trace to mild mitral  regurgitation. There is no mitral valve stenosis.     Tricuspid Valve  The tricuspid valve is not well visualized, but is grossly  normal. There is  mild (1+) tricuspid regurgitation. There is no tricuspid stenosis.     Aortic Valve  The aortic valve is trileaflet. There is mild (1+) aortic regurgitation. No  aortic stenosis is present.     Pulmonic Valve  The pulmonic valve is not well visualized.     Vessels  The aorta root is normal. IVC diameter and respiratory changes fall into an  intermediate range suggesting an RA pressure of 8 mmHg.     Pericardium  Trivial pericardial effusion.     Rhythm  The rhythm was atrial flutter. Rapid ventricular response.  ______________________________________________________________________________    EKG  Sinus rhythm with Premature atrial complexes with Aberrant conduction   Biatrial enlargement   Nonspecific T wave abnormality   Abnormal ECG   Anesthesia Evaluation   Pt has had prior anesthetic.     History of anesthetic complications  - difficult airway.      ROS/MED HX  ENT/Pulmonary: Comment: Hard to intubate  + snoring    (+) JANET risk factors, snores loudly, tobacco use,  (-) recent URI   Neurologic: Comment: vertigo   (-) no seizures, no CVA and migraines   Cardiovascular:     (+) -----dysrhythmias, a-fib, Irregular Heartbeat/Palpitations,     METS/Exercise Tolerance:     Hematologic:  - neg hematologic  ROS     Musculoskeletal:  - neg musculoskeletal ROS     GI/Hepatic: Comment: Hx of diverticulitis - s/p sigmoid colectomy 2012   (-) GERD   Renal/Genitourinary:  - neg Renal ROS     Endo:  - neg endo ROS  (-) Type II DM and thyroid disease   Psychiatric/Substance Use:  - neg psychiatric ROS     Infectious Disease:  - neg infectious disease ROS     Malignancy:       Other:            Physical Exam    Airway        Mallampati: III   TM distance: > 3 FB    Mouth opening: < 3 cm    Respiratory Devices and Support         Dental  no notable dental history         Cardiovascular   cardiovascular exam normal       Rhythm and rate: regular and normal     Pulmonary   pulmonary exam normal        breath  sounds clear to auscultation           OUTSIDE LABS:  CBC:   Lab Results   Component Value Date    WBC 8.9 07/13/2022    HGB 15.3 07/13/2022    HCT 47.8 07/13/2022     07/13/2022     BMP:   Lab Results   Component Value Date     (L) 07/13/2022    POTASSIUM 4.6 07/13/2022    CHLORIDE 96 (L) 07/13/2022    CO2 29 07/13/2022    BUN 9 07/13/2022    CR 0.84 07/13/2022     07/13/2022     COAGS: No results found for: PTT, INR, FIBR  POC: No results found for: BGM, HCG, HCGS  HEPATIC: No results found for: ALBUMIN, PROTTOTAL, ALT, AST, GGT, ALKPHOS, BILITOTAL, BILIDIRECT, ESTELA  OTHER:   Lab Results   Component Value Date    FANNY 9.9 07/13/2022       Anesthesia Plan    ASA Status:  3   NPO Status:  NPO Appropriate    Anesthesia Type: General.     - Airway: ETT   Induction: Propofol.   Maintenance: Balanced.   Techniques and Equipment:     - Airway: Video-Laryngoscope         Consents    Anesthesia Plan(s) and associated risks, benefits, and realistic alternatives discussed. Questions answered and patient/representative(s) expressed understanding.    - Discussed:     - Discussed with:  Patient         Postoperative Care    Pain management: IV analgesics.   PONV prophylaxis: Ondansetron (or other 5HT-3), Dexamethasone or Solumedrol     Comments:                Davonte Pacheco MD

## 2022-07-20 ENCOUNTER — ANESTHESIA (OUTPATIENT)
Dept: CARDIOLOGY | Facility: CLINIC | Age: 57
End: 2022-07-20
Payer: COMMERCIAL

## 2022-07-20 ENCOUNTER — APPOINTMENT (OUTPATIENT)
Dept: LAB | Facility: CLINIC | Age: 57
End: 2022-07-20
Attending: INTERNAL MEDICINE
Payer: COMMERCIAL

## 2022-07-20 ENCOUNTER — HOSPITAL ENCOUNTER (OUTPATIENT)
Facility: CLINIC | Age: 57
Discharge: HOME | End: 2022-07-20
Attending: INTERNAL MEDICINE | Admitting: INTERNAL MEDICINE
Payer: COMMERCIAL

## 2022-07-20 VITALS
HEART RATE: 79 BPM | DIASTOLIC BLOOD PRESSURE: 83 MMHG | HEIGHT: 72 IN | BODY MASS INDEX: 26.14 KG/M2 | RESPIRATION RATE: 18 BRPM | SYSTOLIC BLOOD PRESSURE: 117 MMHG | TEMPERATURE: 99.1 F | OXYGEN SATURATION: 99 % | WEIGHT: 193 LBS

## 2022-07-20 DIAGNOSIS — I48.0 PAROXYSMAL ATRIAL FIBRILLATION (H): Primary | ICD-10-CM

## 2022-07-20 PROBLEM — I48.91 ATRIAL FIBRILLATION (H): Status: ACTIVE | Noted: 2022-07-20

## 2022-07-20 LAB
ABO/RH(D): NORMAL
ACT BLD: 335 SECONDS (ref 74–150)
ACT BLD: 335 SECONDS (ref 74–150)
ACT BLD: 369 SECONDS (ref 74–150)
ACT BLD: 382 SECONDS (ref 74–150)
ACT BLD: 411 SECONDS (ref 74–150)
ACT BLD: 446 SECONDS (ref 74–150)
ANION GAP SERPL CALCULATED.3IONS-SCNC: 7 MMOL/L (ref 3–14)
ANTIBODY SCREEN: NEGATIVE
BUN SERPL-MCNC: 9 MG/DL (ref 7–30)
CALCIUM SERPL-MCNC: 9.1 MG/DL (ref 8.5–10.1)
CHLORIDE BLD-SCNC: 103 MMOL/L (ref 94–109)
CO2 SERPL-SCNC: 28 MMOL/L (ref 20–32)
COHGB MFR BLD: 99 % (ref 92–100)
CREAT SERPL-MCNC: 0.85 MG/DL (ref 0.66–1.25)
ERYTHROCYTE [DISTWIDTH] IN BLOOD BY AUTOMATED COUNT: 17.6 % (ref 10–15)
GFR SERPL CREATININE-BSD FRML MDRD: >90 ML/MIN/1.73M2
GLUCOSE BLD-MCNC: 107 MG/DL (ref 70–99)
HCO3 BLDA-SCNC: 26 MMOL/L (ref 21–28)
HCT VFR BLD AUTO: 45.4 % (ref 40–53)
HGB BLD-MCNC: 14.4 G/DL (ref 13.3–17.7)
LACTATE BLD-SCNC: 1.1 MMOL/L
MCH RBC QN AUTO: 21.9 PG (ref 26.5–33)
MCHC RBC AUTO-ENTMCNC: 31.7 G/DL (ref 31.5–36.5)
MCV RBC AUTO: 69 FL (ref 78–100)
PCO2 BLDA: 45 MM HG (ref 35–45)
PH BLDA: 7.36 [PH] (ref 7.35–7.45)
PLATELET # BLD AUTO: 173 10E3/UL (ref 150–450)
PO2 BLDA: 158 MM HG (ref 80–105)
POTASSIUM BLD-SCNC: 3.7 MMOL/L (ref 3.4–5.3)
RBC # BLD AUTO: 6.59 10E6/UL (ref 4.4–5.9)
SODIUM SERPL-SCNC: 138 MMOL/L (ref 133–144)
SPECIMEN EXPIRATION DATE: NORMAL
WBC # BLD AUTO: 7.1 10E3/UL (ref 4–11)

## 2022-07-20 PROCEDURE — 93005 ELECTROCARDIOGRAM TRACING: CPT

## 2022-07-20 PROCEDURE — 258N000003 HC RX IP 258 OP 636: Performed by: NURSE ANESTHETIST, CERTIFIED REGISTERED

## 2022-07-20 PROCEDURE — 250N000013 HC RX MED GY IP 250 OP 250 PS 637: Performed by: INTERNAL MEDICINE

## 2022-07-20 PROCEDURE — C1894 INTRO/SHEATH, NON-LASER: HCPCS | Performed by: INTERNAL MEDICINE

## 2022-07-20 PROCEDURE — 250N000009 HC RX 250: Performed by: NURSE ANESTHETIST, CERTIFIED REGISTERED

## 2022-07-20 PROCEDURE — 370N000017 HC ANESTHESIA TECHNICAL FEE, PER MIN: Performed by: INTERNAL MEDICINE

## 2022-07-20 PROCEDURE — C1732 CATH, EP, DIAG/ABL, 3D/VECT: HCPCS | Performed by: INTERNAL MEDICINE

## 2022-07-20 PROCEDURE — 93656 COMPRE EP EVAL ABLTJ ATR FIB: CPT | Performed by: INTERNAL MEDICINE

## 2022-07-20 PROCEDURE — 250N000011 HC RX IP 250 OP 636: Performed by: NURSE ANESTHETIST, CERTIFIED REGISTERED

## 2022-07-20 PROCEDURE — C1759 CATH, INTRA ECHOCARDIOGRAPHY: HCPCS | Performed by: INTERNAL MEDICINE

## 2022-07-20 PROCEDURE — 999N000071 HC STATISTIC HEART CATH LAB OR EP LAB

## 2022-07-20 PROCEDURE — 85347 COAGULATION TIME ACTIVATED: CPT | Mod: 91

## 2022-07-20 PROCEDURE — 999N000054 HC STATISTIC EKG NON-CHARGEABLE

## 2022-07-20 PROCEDURE — 93655 ICAR CATH ABLTJ DSCRT ARRHYT: CPT | Performed by: INTERNAL MEDICINE

## 2022-07-20 PROCEDURE — 82310 ASSAY OF CALCIUM: CPT | Performed by: INTERNAL MEDICINE

## 2022-07-20 PROCEDURE — 93010 ELECTROCARDIOGRAM REPORT: CPT | Performed by: INTERNAL MEDICINE

## 2022-07-20 PROCEDURE — 86850 RBC ANTIBODY SCREEN: CPT | Performed by: INTERNAL MEDICINE

## 2022-07-20 PROCEDURE — 250N000009 HC RX 250: Performed by: INTERNAL MEDICINE

## 2022-07-20 PROCEDURE — C1769 GUIDE WIRE: HCPCS | Performed by: INTERNAL MEDICINE

## 2022-07-20 PROCEDURE — 250N000025 HC SEVOFLURANE, PER MIN: Performed by: INTERNAL MEDICINE

## 2022-07-20 PROCEDURE — 272N000001 HC OR GENERAL SUPPLY STERILE: Performed by: INTERNAL MEDICINE

## 2022-07-20 PROCEDURE — 85027 COMPLETE CBC AUTOMATED: CPT | Performed by: INTERNAL MEDICINE

## 2022-07-20 PROCEDURE — C1733 CATH, EP, OTHR THAN COOL-TIP: HCPCS | Performed by: INTERNAL MEDICINE

## 2022-07-20 PROCEDURE — C1887 CATHETER, GUIDING: HCPCS | Performed by: INTERNAL MEDICINE

## 2022-07-20 PROCEDURE — 36415 COLL VENOUS BLD VENIPUNCTURE: CPT | Performed by: INTERNAL MEDICINE

## 2022-07-20 PROCEDURE — 999N000184 HC STATISTIC TELEMETRY

## 2022-07-20 PROCEDURE — 83605 ASSAY OF LACTIC ACID: CPT

## 2022-07-20 PROCEDURE — C1730 CATH, EP, 19 OR FEW ELECT: HCPCS | Performed by: INTERNAL MEDICINE

## 2022-07-20 PROCEDURE — 250N000011 HC RX IP 250 OP 636: Performed by: INTERNAL MEDICINE

## 2022-07-20 RX ORDER — OXYCODONE HYDROCHLORIDE 5 MG/1
5 TABLET ORAL EVERY 4 HOURS PRN
Status: DISCONTINUED | OUTPATIENT
Start: 2022-07-20 | End: 2022-07-20 | Stop reason: HOSPADM

## 2022-07-20 RX ORDER — ONDANSETRON 2 MG/ML
4 INJECTION INTRAMUSCULAR; INTRAVENOUS EVERY 30 MIN PRN
Status: DISCONTINUED | OUTPATIENT
Start: 2022-07-20 | End: 2022-07-20 | Stop reason: HOSPADM

## 2022-07-20 RX ORDER — OXYCODONE HYDROCHLORIDE 5 MG/1
5 TABLET ORAL EVERY 4 HOURS PRN
Status: CANCELLED | OUTPATIENT
Start: 2022-07-20

## 2022-07-20 RX ORDER — GLYCOPYRROLATE 0.2 MG/ML
INJECTION, SOLUTION INTRAMUSCULAR; INTRAVENOUS PRN
Status: DISCONTINUED | OUTPATIENT
Start: 2022-07-20 | End: 2022-07-20

## 2022-07-20 RX ORDER — OXYCODONE HYDROCHLORIDE 5 MG/1
10 TABLET ORAL EVERY 4 HOURS PRN
Status: DISCONTINUED | OUTPATIENT
Start: 2022-07-20 | End: 2022-07-20 | Stop reason: HOSPADM

## 2022-07-20 RX ORDER — SODIUM CHLORIDE, SODIUM LACTATE, POTASSIUM CHLORIDE, CALCIUM CHLORIDE 600; 310; 30; 20 MG/100ML; MG/100ML; MG/100ML; MG/100ML
INJECTION, SOLUTION INTRAVENOUS CONTINUOUS
Status: DISCONTINUED | OUTPATIENT
Start: 2022-07-20 | End: 2022-07-20 | Stop reason: HOSPADM

## 2022-07-20 RX ORDER — FENTANYL CITRATE 50 UG/ML
25 INJECTION, SOLUTION INTRAMUSCULAR; INTRAVENOUS EVERY 5 MIN PRN
Status: DISCONTINUED | OUTPATIENT
Start: 2022-07-20 | End: 2022-07-20 | Stop reason: HOSPADM

## 2022-07-20 RX ORDER — PROTAMINE SULFATE 10 MG/ML
INJECTION, SOLUTION INTRAVENOUS PRN
Status: DISCONTINUED | OUTPATIENT
Start: 2022-07-20 | End: 2022-07-20

## 2022-07-20 RX ORDER — LIDOCAINE 40 MG/G
CREAM TOPICAL
Status: DISCONTINUED | OUTPATIENT
Start: 2022-07-20 | End: 2022-07-20 | Stop reason: HOSPADM

## 2022-07-20 RX ORDER — FENTANYL CITRATE 50 UG/ML
INJECTION, SOLUTION INTRAMUSCULAR; INTRAVENOUS PRN
Status: DISCONTINUED | OUTPATIENT
Start: 2022-07-20 | End: 2022-07-20

## 2022-07-20 RX ORDER — PROPOFOL 10 MG/ML
INJECTION, EMULSION INTRAVENOUS PRN
Status: DISCONTINUED | OUTPATIENT
Start: 2022-07-20 | End: 2022-07-20

## 2022-07-20 RX ORDER — HEPARIN SODIUM 1000 [USP'U]/ML
INJECTION, SOLUTION INTRAVENOUS; SUBCUTANEOUS
Status: DISCONTINUED | OUTPATIENT
Start: 2022-07-20 | End: 2022-07-20 | Stop reason: HOSPADM

## 2022-07-20 RX ORDER — SODIUM CHLORIDE 9 MG/ML
100 INJECTION, SOLUTION INTRAVENOUS CONTINUOUS
Status: ACTIVE | OUTPATIENT
Start: 2022-07-20 | End: 2022-07-20

## 2022-07-20 RX ORDER — HYDROMORPHONE HYDROCHLORIDE 1 MG/ML
0.2 INJECTION, SOLUTION INTRAMUSCULAR; INTRAVENOUS; SUBCUTANEOUS EVERY 5 MIN PRN
Status: DISCONTINUED | OUTPATIENT
Start: 2022-07-20 | End: 2022-07-20 | Stop reason: HOSPADM

## 2022-07-20 RX ORDER — HEPARIN SODIUM 10000 [USP'U]/100ML
INJECTION, SOLUTION INTRAVENOUS CONTINUOUS PRN
Status: DISCONTINUED | OUTPATIENT
Start: 2022-07-20 | End: 2022-07-20 | Stop reason: HOSPADM

## 2022-07-20 RX ORDER — DEXAMETHASONE SODIUM PHOSPHATE 4 MG/ML
INJECTION, SOLUTION INTRA-ARTICULAR; INTRALESIONAL; INTRAMUSCULAR; INTRAVENOUS; SOFT TISSUE PRN
Status: DISCONTINUED | OUTPATIENT
Start: 2022-07-20 | End: 2022-07-20

## 2022-07-20 RX ORDER — ACETAMINOPHEN 325 MG/1
650 TABLET ORAL EVERY 4 HOURS PRN
Status: DISCONTINUED | OUTPATIENT
Start: 2022-07-20 | End: 2022-07-20 | Stop reason: HOSPADM

## 2022-07-20 RX ORDER — ONDANSETRON 2 MG/ML
INJECTION INTRAMUSCULAR; INTRAVENOUS PRN
Status: DISCONTINUED | OUTPATIENT
Start: 2022-07-20 | End: 2022-07-20

## 2022-07-20 RX ORDER — LIDOCAINE HYDROCHLORIDE 20 MG/ML
INJECTION, SOLUTION INFILTRATION; PERINEURAL PRN
Status: DISCONTINUED | OUTPATIENT
Start: 2022-07-20 | End: 2022-07-20

## 2022-07-20 RX ORDER — NEOSTIGMINE METHYLSULFATE 1 MG/ML
VIAL (ML) INJECTION PRN
Status: DISCONTINUED | OUTPATIENT
Start: 2022-07-20 | End: 2022-07-20

## 2022-07-20 RX ORDER — ASPIRIN 81 MG/1
81 TABLET ORAL DAILY
Qty: 28 TABLET | Refills: 0
Start: 2022-07-20 | End: 2022-09-14

## 2022-07-20 RX ORDER — ONDANSETRON 4 MG/1
4 TABLET, ORALLY DISINTEGRATING ORAL EVERY 30 MIN PRN
Status: DISCONTINUED | OUTPATIENT
Start: 2022-07-20 | End: 2022-07-20 | Stop reason: HOSPADM

## 2022-07-20 RX ORDER — ONDANSETRON 2 MG/ML
4 INJECTION INTRAMUSCULAR; INTRAVENOUS EVERY 6 HOURS PRN
Status: DISCONTINUED | OUTPATIENT
Start: 2022-07-20 | End: 2022-07-20 | Stop reason: HOSPADM

## 2022-07-20 RX ORDER — VECURONIUM BROMIDE 1 MG/ML
INJECTION, POWDER, LYOPHILIZED, FOR SOLUTION INTRAVENOUS PRN
Status: DISCONTINUED | OUTPATIENT
Start: 2022-07-20 | End: 2022-07-20

## 2022-07-20 RX ORDER — ONDANSETRON 4 MG/1
4 TABLET, ORALLY DISINTEGRATING ORAL EVERY 6 HOURS PRN
Status: DISCONTINUED | OUTPATIENT
Start: 2022-07-20 | End: 2022-07-20 | Stop reason: HOSPADM

## 2022-07-20 RX ORDER — SODIUM CHLORIDE, SODIUM LACTATE, POTASSIUM CHLORIDE, CALCIUM CHLORIDE 600; 310; 30; 20 MG/100ML; MG/100ML; MG/100ML; MG/100ML
INJECTION, SOLUTION INTRAVENOUS CONTINUOUS PRN
Status: DISCONTINUED | OUTPATIENT
Start: 2022-07-20 | End: 2022-07-20

## 2022-07-20 RX ADMIN — GLYCOPYRROLATE 0.6 MG: 0.2 INJECTION, SOLUTION INTRAMUSCULAR; INTRAVENOUS at 12:31

## 2022-07-20 RX ADMIN — PHENYLEPHRINE HYDROCHLORIDE 0.2 MCG/KG/MIN: 10 INJECTION INTRAVENOUS at 08:20

## 2022-07-20 RX ADMIN — ACETAMINOPHEN 650 MG: 325 TABLET ORAL at 14:41

## 2022-07-20 RX ADMIN — FENTANYL CITRATE 100 MCG: 50 INJECTION, SOLUTION INTRAMUSCULAR; INTRAVENOUS at 09:23

## 2022-07-20 RX ADMIN — PHENYLEPHRINE HYDROCHLORIDE 50 MCG: 10 INJECTION INTRAVENOUS at 11:06

## 2022-07-20 RX ADMIN — DEXAMETHASONE SODIUM PHOSPHATE 4 MG: 4 INJECTION, SOLUTION INTRA-ARTICULAR; INTRALESIONAL; INTRAMUSCULAR; INTRAVENOUS; SOFT TISSUE at 08:04

## 2022-07-20 RX ADMIN — LIDOCAINE HYDROCHLORIDE 100 MG: 20 INJECTION, SOLUTION INFILTRATION; PERINEURAL at 07:50

## 2022-07-20 RX ADMIN — PROTAMINE SULFATE 75 MG: 10 INJECTION, SOLUTION INTRAVENOUS at 12:30

## 2022-07-20 RX ADMIN — PROPOFOL 30 MG: 10 INJECTION, EMULSION INTRAVENOUS at 07:52

## 2022-07-20 RX ADMIN — Medication 3 MG: at 12:31

## 2022-07-20 RX ADMIN — FENTANYL CITRATE 50 MCG: 50 INJECTION, SOLUTION INTRAMUSCULAR; INTRAVENOUS at 07:48

## 2022-07-20 RX ADMIN — PROPOFOL 200 MG: 10 INJECTION, EMULSION INTRAVENOUS at 07:50

## 2022-07-20 RX ADMIN — ONDANSETRON 4 MG: 2 INJECTION INTRAMUSCULAR; INTRAVENOUS at 12:08

## 2022-07-20 RX ADMIN — Medication 1 MG: at 12:32

## 2022-07-20 RX ADMIN — SODIUM CHLORIDE, POTASSIUM CHLORIDE, SODIUM LACTATE AND CALCIUM CHLORIDE: 600; 310; 30; 20 INJECTION, SOLUTION INTRAVENOUS at 07:41

## 2022-07-20 RX ADMIN — PHENYLEPHRINE HYDROCHLORIDE 50 MCG: 10 INJECTION INTRAVENOUS at 09:45

## 2022-07-20 RX ADMIN — SUCCINYLCHOLINE CHLORIDE 100 MG: 20 INJECTION, SOLUTION INTRAMUSCULAR; INTRAVENOUS; PARENTERAL at 07:50

## 2022-07-20 RX ADMIN — MIDAZOLAM 2 MG: 1 INJECTION INTRAMUSCULAR; INTRAVENOUS at 07:44

## 2022-07-20 RX ADMIN — FENTANYL CITRATE 50 MCG: 50 INJECTION, SOLUTION INTRAMUSCULAR; INTRAVENOUS at 10:14

## 2022-07-20 ASSESSMENT — ENCOUNTER SYMPTOMS: SEIZURES: 0

## 2022-07-20 NOTE — DISCHARGE INSTRUCTIONS
Atrial Fib Ablation Discharge Instructions - Femoral     After you go home:    Have an adult stay with you until tomorrow.  You may resume your normal diet.  Relax and take it easy for 3 days.       For 2 days - due to the sedation you received:  DO NOT drive for 2 days  Do NOT make any important or legal decisions.  Do NOT drive or operate machines at home or at work.  Do NOT drink alcohol.    Care of Groin Puncture Site:    For the first 24 hrs - check the puncture site every 1-2 hours while awake.  For 2 days, when you cough, sneeze, laugh or move your bowels, hold your hand over the puncture site and press firmly.  Change the bandaid daily for at least 3 days. If there is minor oozing, apply another bandaid and remove it after 12 hours.  It is normal to have a small bruise or pea size lump at the site.  You may shower tomorrow.  Do NOT take a bath, or use a hot tub or pool for at least 3 days. Do NOT scrub the site. Do not use lotion or powder near the puncture site.    Activity:            For 3 days:  No stooping or squatting  Do NOT lift, push or pull more than 10 pounds   No repetitive motions such as loading , vacuuming, raking or shoveling    Bleeding:    If you start bleeding from the site in your groin, lie down flat and press firmly on the site for 10 minutes.   Once bleeding stops, lay flat for 2 hours.  Call your A Fib nurse if bleeding does not stop. After hours - you will need to go to the Emergency Department.    Call 911 if you have heavy bleeding or bleeding that does not stop.    Medicines:    Take your medications, including blood thinners, unless your provider tells you not to.  If you have stopped any medicines, check with your provider about when to restart them.  If you have pain, you may take Advil (ibuprofen) or Tylenol (acetaminophen).    Call the A Fib RN if:    Chest pain not relieved by Tylenol or Ibuprofen  Difficulty swallowing and/or you are coughing up blood  Shortness  of breath  Increased groin pain or a large or growing hard lump around the site  Groin is red, swollen, hot or tender  Blood or fluid is draining from the groin site  You have chills or a fever greater than 101 F (38 C)  Your leg feels numb, cool or changes color  Groin pain not relieved by Tylenol or Advil  Recurrent irregular or fast heart rate (A Fib) lasting over 4 hours.  Any questions or concerns.    Follow Up Appointments:    An appointment has been set up for you for follow-up care    Contact 911 if you experience any symptoms of a stroke:    Difficulty with your speech  Problems walking or with your balance  Problems with your vision  Side of your face droops or feels numb  Muscle weakness on one side of your body    Chest pain is common in the first 3-4 days after your surgery and should resolve completely. Contact your Cardiologist/Electrophysiologist right away if chest pain returns after the first week.    Heart Rhythms:    You may have some irregular heartbeats. These feel very strong. They may make you feel that the A Fib is going to start again. Give it time. The irregular beats should occur less often.         [facility]    [phone]    If you are calling after hours, please listen to the entire voicemail,   a live  will answer at the end of the message.    [phone] to reach the EP nurses working with Dr Ocampo

## 2022-07-20 NOTE — ANESTHESIA PROCEDURE NOTES
Airway       Patient location during procedure: OR       Procedure Start/Stop Times: 7/20/2022 7:51 AM  Staff -        Performed By: CRNA  Consent for Airway        Urgency: elective  Indications and Patient Condition       Indications for airway management: getachew-procedural       Induction type:intravenous       Mask difficulty assessment: 1 - vent by mask    Final Airway Details       Final airway type: endotracheal airway       Successful airway: ETT - single and Oral  Endotracheal Airway Details        ETT size (mm): 8.0       Cuffed: yes       Successful intubation technique: video laryngoscopy       VL Blade Size: Glidescope 4       Grade View of Cords: 1       Adjucts: stylet       Position: Right       Measured from: lips       Secured at (cm): 23       Bite block used: None    Post intubation assessment        Placement verified by: capnometry, equal breath sounds and chest rise        Number of attempts at approach: 1       Secured with: commercial tube machado and silk tape       Ease of procedure: easy       Dentition: Intact and Unchanged    Medication(s) Administered   Medication Administration Time: 7/20/2022 7:51 AM

## 2022-07-20 NOTE — Clinical Note
proVITAL Med system 12 lead EKG, hemodynamics 5 lead, pulse oximetery, NIBP, Physiocontrol hands off defibrillator/external pacer, with 3 monitoring leads to patient. Baseline assessment done.

## 2022-07-20 NOTE — PROGRESS NOTES
Care Suites Discharge Nursing Note    Patient Information  Name: Vishal Cat  Age: 56 year old    Discharge Education:  Discharge instructions reviewed: Yes - reviewed thoroughly with pt and spouse at bedside  Additional education/resources provided: IS given to pt and education provided  Patient/patient representative verbalizes understanding: Yes  Patient discharging on new medications: Yes - starting 81 mg ASA daily  Medication education completed: Yes    BL groin stop cock sutures removed with thrombix and tegaderm placed to each site. BL groin sites with dressings CDI, CMS intact. Pt has ambulated and eaten without issue. Pt is DTV. PO fluids and ambulation encouraged. No pain or complaints. Detailed report to Perla EASLEY.

## 2022-07-20 NOTE — ANESTHESIA CARE TRANSFER NOTE
Patient: Vishal Cat    Procedure: Procedure(s):  Ablation Atrial Fibrilation       Diagnosis: atirla fibrillation  Diagnosis Additional Information: No value filed.    Anesthesia Type:   General     Note:    Oropharynx: oropharynx clear of all foreign objects and spontaneously breathing  Level of Consciousness: awake  Oxygen Supplementation: face mask  Level of Supplemental Oxygen (L/min / FiO2): 6  Independent Airway: airway patency satisfactory and stable  Dentition: dentition unchanged  Vital Signs Stable: post-procedure vital signs reviewed and stable  Report to RN Given: handoff report given  Patient transferred to: PACU  Comments: Suctioned, spont resp ,lifts head  > 5 sec. Extubated with immediate exchange, to PACU, report to RN.  Handoff Report: Identifed the Patient, Identified the Reponsible Provider, Reviewed the pertinent medical history, Discussed the surgical course, Reviewed Intra-OP anesthesia mangement and issues during anesthesia, Set expectations for post-procedure period and Allowed opportunity for questions and acknowledgement of understanding      Vitals:  Vitals Value Taken Time   /70 07/20/22 1257   Temp     Pulse 85 07/20/22 1259   Resp 9 07/20/22 1259   SpO2 98 % 07/20/22 1259   Vitals shown include unvalidated device data.    Electronically Signed By: PRAMOD Moeller CRNA  July 20, 2022  1:00 PM

## 2022-07-20 NOTE — PROGRESS NOTES
Care Suites Post Procedure Note    Patient Information  Name: Vishal Coxas  Age: 56 year old    Post Procedure  Time patient returned to Care Suites: 1400  Concerns/abnormal assessment: none at this time  If abnormal assessment, provider notified: N/A  Plan/Other: post care as ordered, VSS pt c/o low back pain, Dr Ocampo here, groin sites assessed, CDI w/ stopcocks in place. Cont to monitor    Obed Randall RN

## 2022-07-20 NOTE — Clinical Note
Potential access sites were evaluated for patency using ultrasound.   The right and left femoral vein was selected. Access was obtained under with Sonosite guidance using a micropuncture 21 gauge needle with direct visualization of needle entry.

## 2022-07-20 NOTE — PROGRESS NOTES
Care Suites Admission Nursing Note    Patient Information  Name: Vishal Coxas  Age: 56 year old  Reason for admission: ablation  Care Suites arrival time: 0630    Visitor Information  Name: regina  Informed of visitor restrictions: Yes  1 visitor allowed per patient   Visitor must screen negative for COVID symptoms   Visitor must wear a mask  Waiting rooms closed to visitors    Patient Admission/Assessment   Pre-procedure assessment complete: Yes  If abnormal assessment/labs, provider notified: Yes  NPO: Yes  Medications held per instructions/orders: Yes  Consent: obtained  If applicable, pregnancy test status: deferred  Patient oriented to room: Yes  Education/questions answered: Yes  Plan/other: ablation     Discharge Planning  Discharge name/phone number: regina [phone]   Overnight post sedation caregiver: regina  Discharge location: home    Shannan Traore RN

## 2022-07-20 NOTE — ANESTHESIA POSTPROCEDURE EVALUATION
Patient: Vishal Cat    Procedure: Procedure(s):  Ablation Atrial Fibrilation       Anesthesia Type:  General    Note:  Disposition: Inpatient   Postop Pain Control: Uneventful            Sign Out: Well controlled pain   PONV: No   Neuro/Psych: Uneventful            Sign Out: Acceptable/Baseline neuro status   Airway/Respiratory: Uneventful            Sign Out: Acceptable/Baseline resp. status; O2 supplementation   CV/Hemodynamics: Uneventful            Sign Out: Acceptable CV status; No obvious hypovolemia; No obvious fluid overload   Other NRE: NONE   DID A NON-ROUTINE EVENT OCCUR? No           Last vitals:  Vitals Value Taken Time   /64 07/20/22 1350   Temp 37.3  C (99.1  F) 07/20/22 1350   Pulse 80 07/20/22 1350   Resp 12 07/20/22 1350   SpO2 94 % 07/20/22 1351   Vitals shown include unvalidated device data.    Electronically Signed By: Davonte Pacheco MD  July 20, 2022  5:30 PM

## 2022-07-20 NOTE — PROGRESS NOTES
1715 Report received from Diana Moralez RN.  1720 Pt A/O. Thrombix drsg intact to bilateral groin puncture sites. Small amt bloody drng noted on both drsgs - no change or increase reported since 1630. No oozing or hematoma noted. Both areas soft & flat. Pt denies pain. Pt ambulating independently in hallways. Pt's wife at bedside.  1745 Pt voiding adeq at this time. Reports slight burning with urination.   1813 Pt discharged per w/c to private vehicle. All personal belongings taken w/ pt.

## 2022-07-20 NOTE — INTERVAL H&P NOTE
I have reviewed the surgical (or preoperative) H&P that is linked to this encounter, and examined the patient. There are no significant changes    Clinical Conditions Present on Arrival:  Clinically Significant Risk Factors Present on Admission                 # Coagulation Defect: home medication list includes an anticoagulant medication   # Overweight: Estimated body mass index is 26.18 kg/m  as calculated from the following:    Height as of this encounter: 1.829 m (6').    Weight as of this encounter: 87.5 kg (193 lb).

## 2022-07-22 ENCOUNTER — VIRTUAL VISIT (OUTPATIENT)
Dept: CARDIOLOGY | Facility: CLINIC | Age: 57
End: 2022-07-22
Payer: COMMERCIAL

## 2022-07-22 DIAGNOSIS — I48.0 PAROXYSMAL ATRIAL FIBRILLATION (H): Primary | ICD-10-CM

## 2022-07-22 LAB
ATRIAL RATE - MUSE: 77 BPM
DIASTOLIC BLOOD PRESSURE - MUSE: NORMAL MMHG
INTERPRETATION ECG - MUSE: NORMAL
P AXIS - MUSE: 68 DEGREES
PR INTERVAL - MUSE: 148 MS
QRS DURATION - MUSE: 82 MS
QT - MUSE: 414 MS
QTC - MUSE: 468 MS
R AXIS - MUSE: 68 DEGREES
SYSTOLIC BLOOD PRESSURE - MUSE: NORMAL MMHG
T AXIS - MUSE: 75 DEGREES
VENTRICULAR RATE- MUSE: 77 BPM

## 2022-07-22 PROCEDURE — 99207 PR NO CHARGE NURSE ONLY: CPT

## 2022-07-22 NOTE — PROGRESS NOTES
Post PVI Procedural Follow Up Call    Pt is s/p PVI from 7-20-22 with Dr Ocampo  PC was placed to pt, spoke to pt    General Assessment:     Weight: Pt reports weight is at baseline compared to pre procedural weight    Pain: Pt denies generalized or localized pain abnormal to healing s/p Pt c/o slight chest discomfort yesterday and today, most often felt first thing in the morning, relieved by Ibuprofen.    /GI: Pt denies difficulty swallowing, denies urinary retention/difficulty, reports no s/s of infection, report normal appetite and reports staying hydrated.  Pt c/o constipation, hasn't had a bowel movement since Tuesday 7-19, pt reports normally having bowel movements daily. Instructed pt to avoid straining or bearing down when trying to have bowel movement. Suggested pt try OTC stool softener, fiber powder or Miralax. Also encouraged pt to increase ambulation to promote GI motility. Pt verbalized understanding.    Respiratory: Pt c/o very mild SOB with walking up stairs yesterday but nothing of concern to the pt, denies difficulty breathing, denies throat pain, continues to use IS as instructed, denies changes/abnormal sputum and denies any further symptoms abnormal to normal healing process s/p PVI.    Activity: Pt is tolerating advancement in activity while following physical restrictions, staying well hydrated and gradually working into baseline activity.     Rhythm Assessment:   Pt reports occasional breakthrough of AF normal to PVI recovery, denies palpitations and denies symptoms or sustained AF episodes.    Procedure Site Assessment:   Pts no visible/physical changes in groin sites and some bruising around sites without significant change from hospital discharge and normal to PVI recovery.     Anticoagulation/Medication:  Pt remain on Eliquis without interruption  Pt confirms taking ASA 81mg Daily, and will continue taking this for 1 mo s/p   Pt confirms that he continues taking his Metoprolol tartrate  100 mg twice daily.    Education completed with pt at this visit:  Reviewed normal post-op PVI healing process, when to contact EP-RN/EP-MD, contact information was given to the pt for further concerns or questions and pt verbalized understanding    Follow up  Pts AVS was printed and mailed to pt by scheduling team, pt will be seen by EP NP in 4-6 wks, monitor will be ordered at this follow-up if indicated and 3mo follow-up and monitor will be determined at 6wk follow-up by EP NP    7/22/2022 11:17 AM  Reece Encarnacion RN

## 2022-07-22 NOTE — PATIENT INSTRUCTIONS
Your anticoagulation medication Eliquis:  It is important to remain on your anticoagulation medication uninterrupted after your ablation to reduce your risk of a stroke or heart attack, do not stop this medication  Please remain on your aspirin for 1 month, then you can stop    Healing from your pulmonary vein ablation:  Stay well hydrated, and increase your fluid intake during this recovery period  High protein foods aide in your bodies healing process  No aggressive or aerobic activity for 7-10 days, and do not lift more than 10 pounds for 7 days   Increase your activity gradually over the next 5-10 days, working back to your normal daily activity  If you are experiencing pain at your groin sites from the procedure, we advise applying ice for 20 minute durations 3-4 times per day     Please call me if any of the following occur:  Episodes of Atrial Fibrillation lasting greater than 4 hours, or if you notice the episodes are increasing in frequency or duration  If you develop shortness of breath, dizziness, or unresolving chest pains   Changes at your groin sites including swelling, hardening, drainage, increase in bruising, or an increase in pain  If you develop a temperature greater than 100.5 degrees (especially weeks 2-5 post   Procedure)    Call 911 if you are having symptoms of a stroke; difficulty with your speech, problems walking, difficulty with balance, vision disturbances, facial drooping or numbness, and muscle weakness on one side of your body     Your follow up appointments are as follows:  You will be seen by the electrophysiologist nurse practitioner at 6 weeks after your ablation  At your 6 week appointment, it will be determined if a 3 month follow-up is needed    Sincerely,  Reece Encarnacion RN [phone]    After hours please contact the on call service at # [phone]

## 2022-08-25 DIAGNOSIS — I48.0 PAROXYSMAL ATRIAL FIBRILLATION (H): ICD-10-CM

## 2022-08-25 RX ORDER — PANTOPRAZOLE SODIUM 40 MG/1
TABLET, DELAYED RELEASE ORAL
Qty: 90 TABLET | Refills: 1 | Status: SHIPPED | OUTPATIENT
Start: 2022-08-25 | End: 2022-09-14

## 2022-09-14 ENCOUNTER — HOSPITAL ENCOUNTER (OUTPATIENT)
Dept: CARDIOLOGY | Facility: CLINIC | Age: 57
Discharge: HOME | End: 2022-09-14
Attending: NURSE PRACTITIONER
Payer: COMMERCIAL

## 2022-09-14 ENCOUNTER — OFFICE VISIT (OUTPATIENT)
Dept: CARDIOLOGY | Facility: CLINIC | Age: 57
End: 2022-09-14
Attending: NURSE PRACTITIONER
Payer: COMMERCIAL

## 2022-09-14 VITALS
RESPIRATION RATE: 16 BRPM | BODY MASS INDEX: 25.5 KG/M2 | OXYGEN SATURATION: 100 % | HEART RATE: 75 BPM | WEIGHT: 188 LBS | DIASTOLIC BLOOD PRESSURE: 68 MMHG | SYSTOLIC BLOOD PRESSURE: 118 MMHG

## 2022-09-14 DIAGNOSIS — Z98.890 STATUS POST CATHETER ABLATION OF ATRIAL FIBRILLATION: ICD-10-CM

## 2022-09-14 DIAGNOSIS — I48.0 PAROXYSMAL ATRIAL FIBRILLATION (H): ICD-10-CM

## 2022-09-14 DIAGNOSIS — I48.0 PAROXYSMAL ATRIAL FIBRILLATION (H): Primary | ICD-10-CM

## 2022-09-14 PROCEDURE — 999N000096 CARDIAC MOBILE TELEMETRY MONITOR

## 2022-09-14 PROCEDURE — 99214 OFFICE O/P EST MOD 30 MIN: CPT | Performed by: NURSE PRACTITIONER

## 2022-09-14 RX ORDER — METOPROLOL TARTRATE 50 MG
50 TABLET ORAL 2 TIMES DAILY
Qty: 60 TABLET | Refills: 3 | Status: SHIPPED | OUTPATIENT
Start: 2022-09-14 | End: 2022-09-14

## 2022-09-14 NOTE — LETTER
9/14/2022    JOHANN GRIFFITH  [facility] [address]  [place] [address]    RE: Vishal Cat       Dear Colleague,     I had the pleasure of seeing Vishal Cat in the [facility].    Thank you, Dr. Griffith, for asking the [facility] team to see Mr. Vishal Cat to evaluate paroxysmal atrial fibrillation.    Assessment/Recommendations     Assessment/Plan:    Diagnoses and all orders for this visit:  Paroxysmal atrial fibrillation (H) and no symptoms of recurrence of A. fib and had been directly symptomatic with A. fib prior to ablation.  I recommended a 2-week symptom monitor to look for silent atrial fib or flutter.  I discussed details of the monitor.  He can have this placed at any point since he has not had any symptoms of recurrence of arrhythmias post ablation.  I recommended taper of beta-blocker by 50% and to decrease metoprolol tartrate 100 mg p.o. twice daily to metoprolol tartrate 50 mg p.o. twice daily.  He has no history of hypertension and normotensive today.  Status post catheter ablation of atrial fibrillation and no complications post ablation.      [id number] score of 0 and on Eliquis.  I anticipate that he will come off of Eliquis 3 months post ablation and go on aspirin.  To discuss anticoagulation on follow-up with Dr. Ocampo.  Follow up in clinic with Dr. Ocampo in about 2 months.     History of Present Illness/Subjective     Vishal Cat is a very pleasant 57 year old male who comes in today for EP follow-up after pulmonary vein isolation ablation.  Vishal Cat has a known history of atrial fibrillation with increasing frequency and length of episodes.  His symptoms of atrial fibrillation include warm feeling in chest, palpitations, fatigue, lightheadedness.  He was started on low-dose sotalol and dose has been increased to high-dose to try to control his atrial fibrillation.  Vishal reports that he has had only 3 episodes of atrial  fibrillation that he is aware of since he increased his sotalol to 160 mg p.o. twice daily.  He feels fatigued on this medicine.  He is having problems with vertigo since general anesthesia with bowel resection.  He has a history of snoring and today's sleep study which was negative for JANET.   On July 20, 2022 Vishal had pulmonary vein isolation ablation with Dr. Ocampo in which he had cryo to 4 pulmonary veins and right CTI flutter ablation.  Left atrial voltage was noted to be normal.  Vishal is happy to report that he has had no complications post ablation.  He is back to all normal activities within less than a week.  He feels so much better since ablation as he was having frequent episodes of atrial fib despite being on high-dose sotalol.  He travels frequently in his job.  He denies any cardiac symptoms today.    Cardiographics (reviewed):  ECHO 1/20/2022 Interpretation Summary     The left ventricle is normal in size.  The visual ejection fraction is 65-70%.  Normal left ventricular wall motion  The right ventricle is normal in size and function.  The right ventricle is not well visualized.  IVC diameter and respiratory changes fall into an intermediate range  suggesting an RA pressure of 8 mmHg.  The rhythm was atrial flutter.  Rapid ventricular response     Cardiac testing personally reviewed:  INTERPRETATION Holter monitoring 3/4/2022 to 3/5/2022 (24hrs monitored) Predominant rhythm was sinus rhythm. Paroxysmal atrial fibrillation and atrial flutter accounting for 13% of the monitored interval. Heart rate range 56-165bpm, average 89bpm. 2 episodes of nonsustained atrial tachycardia, longest lasting 6 beats, fastest 189bpm. 3 episodes of nonsustained ventricular tachycardia, longest 6 beats, fastest 207bpm. These occurred during atrial fibrillation with rapid ventricular rates, and may partially be related to aberrancy. No pauses of over 3.0s. Rare supraventricular ectopy (0%). Rare ventricular ectopy (0%).  Symptoms of palpitations correlated to sinus rhythm with supraventricular ectopy. IMPRESSION Sinus rhythm with paroxysmal atrial fibrillation and atrial flutter with associated rapid ventricular rates. Intervals of nonsustained wide complex tachycardia seen during atrial fibrillation with rapid ventricular rates may represent aberrancy vs nonsustained ventricular tachycardia.            Problem List:  Patient Active Problem List   Diagnosis     Paroxysmal atrial fibrillation (H)     Snoring     Status post catheter ablation of atrial fibrillation     Revi  e  Physical Examination Review of Systems   w [facility]  There were no vitals taken for this visit.  There is no height or weight on file to calculate BMI.  Wt Readings from Last 3 Encounters:   07/20/22 87.5 kg (193 lb)   07/13/22 87.5 kg (193 lb)   06/29/22 88.5 kg (195 lb)     General Appearance:   Alert, well-appearing and in no acute distress.   HEENT: Atraumatic, normocephalic.  No scleral icterus, normal conjunctivae; mucous membranes pink and moist.     Chest: Chest symmetric, spine straight.   Lungs:   Respirations unlabored.   Cardiovascular:   Normal JVD, no edema.       Extremities: No cyanosis or clubbing   Musculoskeletal: Moves all extremities   Skin: Warm, dry, intact.    Neurologic: Mood and affect are appropriate, alert and oriented to person, place, time, and situation     ROS: 10 point ROS neg other than the symptoms noted above in the HPI.     Medical History  Surgical History Family History Social History     Past Medical History:   Diagnosis Date     Atrial fibrillation (H)      Snoring 12/13/2021    Sleep study recommended 12/2021    Past Surgical History:   Procedure Laterality Date     EP ABLATION FOCAL AFIB Left 7/20/2022    Procedure: Ablation Atrial Fibrilation;  Surgeon: Akbar Ocampo MD;  Location:  HEART CARDI[facility] CATH LAB     SIGMOIDECTOMY N/A     Family History   Problem Relation Age of Onset     Coronary Artery Disease Mother       Diabetes Mother      Atrial fibrillation Mother      Atrial fibrillation Father      Prostate Cancer Father      Sleep Apnea Brother      Obesity Brother      Diabetes Maternal Grandfather      Pacemaker Paternal Grandfather      No Known Problems Half-Sister      No Known Problems Half-Sister      No Known Problems Half-Brother     History   Smoking Status     Current Every Day Smoker     Packs/day: 1.00     Types: Cigarettes   Smokeless Tobacco     Never Used     Comment: smoked for about as long as he can remember, mid teens     Social History    Substance and Sexual Activity      Alcohol use: Yes        Comment: few drinks nightly after dinner       Medications  Allergies     Current Outpatient Medications   Medication Sig Dispense Refill     apixaban ANTICOAGULANT (ELIQUIS ANTICOAGULANT) 5 MG tablet Take 1 tablet (5 mg) by mouth 2 times daily ;  Start on 6-22-22, stop Aspirin (Patient taking differently: Take 5 mg by mouth 2 times daily ;  Start on 6-22-22, stop Aspirin) 60 tablet 6     aspirin 81 MG EC tablet Take 1 tablet (81 mg) by mouth daily 28 tablet 0     calcium-vitamin D (CALCIUM-VITAMIN D) 500 mg(1,250mg) -200 unit per tablet [CALCIUM-VITAMIN D (CALCIUM-VITAMIN D) 500 MG(1,250MG) -200 UNIT PER TABLET] Take 1 tablet by mouth daily.       EPINEPHrine (ANY BX GENERIC EQUIV) 0.3 MG/0.3ML injection 2-pack as needed        Lactobacillus rhamnosus GG (CULTURELLE) 10-15 Billion cell capsule [LACTOBACILLUS RHAMNOSUS GG (CULTURELLE) 10-15 BILLION CELL CAPSULE] Take 1 capsule by mouth daily.       metoprolol tartrate (LOPRESSOR) 100 MG tablet Take 1 tablet (100 mg) by mouth 2 times daily Start July 17 60 tablet 3     Omega-3 Fatty Acids (FISH OIL PO) Take 1 capsule by mouth daily       pantoprazole (PROTONIX) 40 MG EC tablet TAKE 1 TABLET BY MOUTH EVERY DAY STARTING 3 DAYS PRIOR TO ABLATION TO CONTINUE FOR 6 WEEKS AFTER. START ON JULY 17 90 tablet 1     psyllium (METAMUCIL) 3.4 gram packet [PSYLLIUM (METAMUCIL)  3.4 GRAM PACKET] Take 1 packet by mouth daily.       VITAMIN D PO Take 5,000 Units by mouth        Allergies   Allergen Reactions     Bee Venom       Medical, surgical, family, social history, and medications were all reviewed and updated as necessary.   Lab Results    Chemistry/lipid CBC Cardiac Enzymes/BNP/TSH/INR   No results for input(s): CHOL, HDL, LDL, TRIG, CHOLHDLRATIO in the last 51520 hours.  No results for input(s): LDL in the last 10231 hours.  Recent Labs   Lab Test 07/20/22  0536      POTASSIUM 3.7   CHLORIDE 103   CO2 28   *   BUN 9   CR 0.85   GFRESTIMATED >90   FANNY 9.1     Recent Labs   Lab Test 07/20/22  0536 07/13/22  1518   CR 0.85 0.84     No results for input(s): A1C in the last 77965 hours.       Recent Labs   Lab Test 07/20/22  0536   WBC 7.1   HGB 14.4   HCT 45.4   MCV 69*        Recent Labs   Lab Test 07/20/22  0536 07/13/22  1255   HGB 14.4 15.3    No results for input(s): TROPONINI in the last 47781 hours.  No results for input(s): BNP, NTBNPI, NTBNP in the last 42478 hours.  No results for input(s): TSH in the last 89790 hours.  No results for input(s): INR in the last 25634 hours.       Total Time-30 minutes spent on date of encounter doing chart review, history and exam, documentation and further activities as noted above.  This note has been dictated using voice recognition software. Any grammatical, typographical, or context distortions are unintentional and inherent to the software.        Thank you for allowing me to participate in the care of your patient.      Sincerely,     PRAMOD Hernández CNP     [facility]  cc:   PRAMOD Hernández CNP  [address] [facility] [address]  [place] [id number]

## 2022-09-14 NOTE — PROGRESS NOTES
Thank you, Dr. Doherty, for asking the [facility] team to see Mr. Vishal Cat to evaluate paroxysmal atrial fibrillation.    Assessment/Recommendations     Assessment/Plan:    Diagnoses and all orders for this visit:  Paroxysmal atrial fibrillation (H) and no symptoms of recurrence of A. fib and had been directly symptomatic with A. fib prior to ablation.  I recommended a 2-week symptom monitor to look for silent atrial fib or flutter.  I discussed details of the monitor.  He can have this placed at any point since he has not had any symptoms of recurrence of arrhythmias post ablation.  I recommended taper of beta-blocker by 50% and to decrease metoprolol tartrate 100 mg p.o. twice daily to metoprolol tartrate 50 mg p.o. twice daily.  He has no history of hypertension and normotensive today.  Status post catheter ablation of atrial fibrillation and no complications post ablation.      [id number] score of 0 and on Eliquis.  I anticipate that he will come off of Eliquis 3 months post ablation and go on aspirin.  To discuss anticoagulation on follow-up with Dr. Ocampo.  Follow up in clinic with Dr. Ocampo in about 2 months.     History of Present Illness/Subjective     Vishal Cat is a very pleasant 57 year old male who comes in today for EP follow-up after pulmonary vein isolation ablation.  Vishal Cat has a known history of atrial fibrillation with increasing frequency and length of episodes.  His symptoms of atrial fibrillation include warm feeling in chest, palpitations, fatigue, lightheadedness.  He was started on low-dose sotalol and dose has been increased to high-dose to try to control his atrial fibrillation.  Vishal reports that he has had only 3 episodes of atrial fibrillation that he is aware of since he increased his sotalol to 160 mg p.o. twice daily.  He feels fatigued on this medicine.  He is having problems with vertigo since general anesthesia with bowel resection.  He has a  history of snoring and today's sleep study which was negative for JANET.   On July 20, 2022 Vishal had pulmonary vein isolation ablation with Dr. Ocampo in which he had cryo to 4 pulmonary veins and right CTI flutter ablation.  Left atrial voltage was noted to be normal.  Vishal is happy to report that he has had no complications post ablation.  He is back to all normal activities within less than a week.  He feels so much better since ablation as he was having frequent episodes of atrial fib despite being on high-dose sotalol.  He travels frequently in his job.  He denies any cardiac symptoms today.    Cardiographics (reviewed):  ECHO 1/20/2022 Interpretation Summary     The left ventricle is normal in size.  The visual ejection fraction is 65-70%.  Normal left ventricular wall motion  The right ventricle is normal in size and function.  The right ventricle is not well visualized.  IVC diameter and respiratory changes fall into an intermediate range  suggesting an RA pressure of 8 mmHg.  The rhythm was atrial flutter.  Rapid ventricular response     Cardiac testing personally reviewed:  INTERPRETATION Holter monitoring 3/4/2022 to 3/5/2022 (24hrs monitored) Predominant rhythm was sinus rhythm. Paroxysmal atrial fibrillation and atrial flutter accounting for 13% of the monitored interval. Heart rate range 56-165bpm, average 89bpm. 2 episodes of nonsustained atrial tachycardia, longest lasting 6 beats, fastest 189bpm. 3 episodes of nonsustained ventricular tachycardia, longest 6 beats, fastest 207bpm. These occurred during atrial fibrillation with rapid ventricular rates, and may partially be related to aberrancy. No pauses of over 3.0s. Rare supraventricular ectopy (0%). Rare ventricular ectopy (0%). Symptoms of palpitations correlated to sinus rhythm with supraventricular ectopy. IMPRESSION Sinus rhythm with paroxysmal atrial fibrillation and atrial flutter with associated rapid ventricular rates. Intervals of  nonsustained wide complex tachycardia seen during atrial fibrillation with rapid ventricular rates may represent aberrancy vs nonsustained ventricular tachycardia.            Problem List:  Patient Active Problem List   Diagnosis     Paroxysmal atrial fibrillation (H)     Snoring     Status post catheter ablation of atrial fibrillation     Revi  e  Physical Examination Review of Systems   w [facility]  There were no vitals taken for this visit.  There is no height or weight on file to calculate BMI.  Wt Readings from Last 3 Encounters:   07/20/22 87.5 kg (193 lb)   07/13/22 87.5 kg (193 lb)   06/29/22 88.5 kg (195 lb)     General Appearance:   Alert, well-appearing and in no acute distress.   HEENT: Atraumatic, normocephalic.  No scleral icterus, normal conjunctivae; mucous membranes pink and moist.     Chest: Chest symmetric, spine straight.   Lungs:   Respirations unlabored.   Cardiovascular:   Normal JVD, no edema.       Extremities: No cyanosis or clubbing   Musculoskeletal: Moves all extremities   Skin: Warm, dry, intact.    Neurologic: Mood and affect are appropriate, alert and oriented to person, place, time, and situation     ROS: 10 point ROS neg other than the symptoms noted above in the HPI.     Medical History  Surgical History Family History Social History     Past Medical History:   Diagnosis Date     Atrial fibrillation (H)      Snoring 12/13/2021    Sleep study recommended 12/2021    Past Surgical History:   Procedure Laterality Date     EP ABLATION FOCAL AFIB Left 7/20/2022    Procedure: Ablation Atrial Fibrilation;  Surgeon: Akbar Ocampo MD;  Location:  HEART CARDIAC CATH LAB     SIGMOIDECTOMY N/A     Family History   Problem Relation Age of Onset     Coronary Artery Disease Mother      Diabetes Mother      Atrial fibrillation Mother      Atrial fibrillation Father      Prostate Cancer Father      Sleep Apnea Brother      Obesity Brother      Diabetes Maternal Grandfather      Pacemaker Paternal  Grandfather      No Known Problems Half-Sister      No Known Problems Half-Sister      No Known Problems Half-Brother     History   Smoking Status     Current Every Day Smoker     Packs/day: 1.00     Types: Cigarettes   Smokeless Tobacco     Never Used     Comment: smoked for about as long as he can remember, mid teens     Social History    Substance and Sexual Activity      Alcohol use: Yes        Comment: few drinks nightly after dinner       Medications  Allergies     Current Outpatient Medications   Medication Sig Dispense Refill     apixaban ANTICOAGULANT (ELIQUIS ANTICOAGULANT) 5 MG tablet Take 1 tablet (5 mg) by mouth 2 times daily ;  Start on 6-22-22, stop Aspirin (Patient taking differently: Take 5 mg by mouth 2 times daily ;  Start on 6-22-22, stop Aspirin) 60 tablet 6     aspirin 81 MG EC tablet Take 1 tablet (81 mg) by mouth daily 28 tablet 0     calcium-vitamin D (CALCIUM-VITAMIN D) 500 mg(1,250mg) -200 unit per tablet [CALCIUM-VITAMIN D (CALCIUM-VITAMIN D) 500 MG(1,250MG) -200 UNIT PER TABLET] Take 1 tablet by mouth daily.       EPINEPHrine (ANY BX GENERIC EQUIV) 0.3 MG/0.3ML injection 2-pack as needed        Lactobacillus rhamnosus GG (CULTURELLE) 10-15 Billion cell capsule [LACTOBACILLUS RHAMNOSUS GG (CULTURELLE) 10-15 BILLION CELL CAPSULE] Take 1 capsule by mouth daily.       metoprolol tartrate (LOPRESSOR) 100 MG tablet Take 1 tablet (100 mg) by mouth 2 times daily Start July 17 60 tablet 3     Omega-3 Fatty Acids (FISH OIL PO) Take 1 capsule by mouth daily       pantoprazole (PROTONIX) 40 MG EC tablet TAKE 1 TABLET BY MOUTH EVERY DAY STARTING 3 DAYS PRIOR TO ABLATION TO CONTINUE FOR 6 WEEKS AFTER. START ON JULY 17 90 tablet 1     psyllium (METAMUCIL) 3.4 gram packet [PSYLLIUM (METAMUCIL) 3.4 GRAM PACKET] Take 1 packet by mouth daily.       VITAMIN D PO Take 5,000 Units by mouth        Allergies   Allergen Reactions     Bee Venom       Medical, surgical, family, social history, and medications were  all reviewed and updated as necessary.   Lab Results    Chemistry/lipid CBC Cardiac Enzymes/BNP/TSH/INR   No results for input(s): CHOL, HDL, LDL, TRIG, CHOLHDLRATIO in the last 47683 hours.  No results for input(s): LDL in the last 43231 hours.  Recent Labs   Lab Test 07/20/22  0536      POTASSIUM 3.7   CHLORIDE 103   CO2 28   *   BUN 9   CR 0.85   GFRESTIMATED >90   FANNY 9.1     Recent Labs   Lab Test 07/20/22  0536 07/13/22  1518   CR 0.85 0.84     No results for input(s): A1C in the last 22196 hours.       Recent Labs   Lab Test 07/20/22  0536   WBC 7.1   HGB 14.4   HCT 45.4   MCV 69*        Recent Labs   Lab Test 07/20/22  0536 07/13/22  1255   HGB 14.4 15.3    No results for input(s): TROPONINI in the last 21896 hours.  No results for input(s): BNP, NTBNPI, NTBNP in the last 50225 hours.  No results for input(s): TSH in the last 02136 hours.  No results for input(s): INR in the last 94805 hours.       Total Time-30 minutes spent on date of encounter doing chart review, history and exam, documentation and further activities as noted above.  This note has been dictated using voice recognition software. Any grammatical, typographical, or context distortions are unintentional and inherent to the software.

## 2022-09-14 NOTE — PATIENT INSTRUCTIONS
Vishal Cat,    It was a pleasure to see you today at the [facility].     My recommendations after this visit include:    Decrease metoprolol tartrate to 50 mg 1 tab orally twice a day.    Make an appointment to see Dr. Ocampo in 2 months as follow up after your ablation.    Please schedule 2 week one patch monitor to be placed at least a month before your appointment with Dr. Ocampo.   It can be placed at [facility] or [facility] and you mail it back when you are done.  Call if you have any issues with your insurance covering the monitor cost.  Please call and see if one available today.    Please call if you have any symptoms of atrial fibrillation or flutter especially before the monitor is placed.      My contact information:  Cirilo Parker CNP  After Hours or Scheduling  [phone]  My Nurse---Ronn Huitron [phone]         
Improved

## 2022-09-30 PROCEDURE — 93228 REMOTE 30 DAY ECG REV/REPORT: CPT | Performed by: INTERNAL MEDICINE

## 2022-10-20 ENCOUNTER — TELEPHONE (OUTPATIENT)
Dept: CARDIOLOGY | Facility: CLINIC | Age: 57
End: 2022-10-20

## 2022-10-20 DIAGNOSIS — I48.0 PAROXYSMAL ATRIAL FIBRILLATION (H): Primary | ICD-10-CM

## 2022-10-20 NOTE — TELEPHONE ENCOUNTER
Cirilo-  Pt calling in wanting to know if he can come off Eliquis s/p 3mo out from PVI  He did complete the monitor, records available in Epic  No AF noted  You had advised at his 4-6 wk follow-up, AC would be discussed at follow-up with Terrence on 11/16 (3mo follow-up)  Pt wondering if he can stop Eliquis prior to this as its been 3mo  Pt not having any issues or concerns on Eliquis  Ok to come off, or advise pt to wait until follow-up in Nov?  Thank you  Marie

## 2022-10-20 NOTE — TELEPHONE ENCOUNTER
Health Call Center    Phone Message    May a detailed message be left on voicemail: yes     Reason for Call: Medication Question or concern regarding medication   Prescription Clarification  Name of Medication: Eloquis  Prescribing Provider: Liudmila Parker   Pharmacy:    What on the order needs clarification? Vishal was asked to call and see if he can discontinue his Eloquis. Please call patient to discuss.           Action Taken: Other: cardiology    Travel Screening: Not Applicable   Thank you!  [facility]

## 2022-10-20 NOTE — TELEPHONE ENCOUNTER
Liudmila Parker APRN CNP  [facility] Ep Support [place] [facility] 11 minutes ago (4:16 PM)     JH  Okay to stop Eliquis then can go on aspirin 81 mg 1 tablet orally every day in its place.   Cirilo

## 2022-10-21 NOTE — TELEPHONE ENCOUNTER
Pt was called, corresponding information/recommendations reviewed, verbalized understanding, has no further questions at this time, contact information was given for further concerns/questions and medication list updated   10/21/2022 12:30 PM  Nevaeh Middleton RN

## 2022-11-16 ENCOUNTER — OFFICE VISIT (OUTPATIENT)
Dept: CARDIOLOGY | Facility: CLINIC | Age: 57
End: 2022-11-16
Payer: COMMERCIAL

## 2022-11-16 VITALS
DIASTOLIC BLOOD PRESSURE: 64 MMHG | SYSTOLIC BLOOD PRESSURE: 118 MMHG | WEIGHT: 191 LBS | BODY MASS INDEX: 25.9 KG/M2 | HEART RATE: 84 BPM | RESPIRATION RATE: 14 BRPM

## 2022-11-16 DIAGNOSIS — Z98.890 STATUS POST CATHETER ABLATION OF ATRIAL FIBRILLATION: ICD-10-CM

## 2022-11-16 DIAGNOSIS — I48.0 PAROXYSMAL ATRIAL FIBRILLATION (H): Primary | ICD-10-CM

## 2022-11-16 PROCEDURE — 99214 OFFICE O/P EST MOD 30 MIN: CPT | Performed by: INTERNAL MEDICINE

## 2022-11-16 NOTE — LETTER
11/16/2022    [facility] [address]  [place] [address]    RE: Vishal Cat       Dear Colleague,     I had the pleasure of seeing Vishal Cat in the [facility].    [facility]  Cardiac Electrophysiology     Progress Note: Akbar Ocampo MD    Primary Care: Johann Doherty MD    Primary Cardiologist: Patient will be referred to general cardiology    Primary Electrophysiologist: Akbar Ocampo MD    Assessment:         Paroxysmal atrial fibrillation: Patient is status post ablation of atrial fibrillation 7/20/2022 (PVI + RA-CTI line).  Patient currently reports no symptomatic recurrence of atrial fibrillation or flutter.  Cardiac event monitor from September 2022 demonstrated no atrial fibrillation or flutter.  The patient has a low [id number] score and is currently on low-dose aspirin therapy.     Recommendations:    No change in current medications    Patient will be scheduled for follow-up to establish care with general cardiology in 6 months and follow-up in the atrial fibrillation clinic with the EP [facility] in 12 months.    Time spent: 30 minutes spent on the date of the encounter doing chart review, history and exam, documentation and further activities as noted above.    Subjective:  Vishal Cat (57 year old male) returns to the arrhythmia clinic for interval reevaluation of his rhythm status post ablation.  The patient reports that his recovery was uneventful, and that he had no urgent or emergent hospital or office visits.  The patient has had no recurrent symptoms suggesting return of atrial fibrillation or flutter.  He reports no exertional dyspnea, exertional chest discomfort, orthopnea, PND, or ankle edema.  He has resumed all activities.  He switched off oral anticoagulant therapy to aspirin therapy approximately 6 weeks following his ablation procedure on the advice of the arrhythmia clinic [facility].  The patient has no other changes in his  general health.    Current Outpatient Medications   Medication Sig Dispense Refill     aspirin (ASA) 81 MG EC tablet Take 1 tablet (81 mg) by mouth daily 90 tablet 3     calcium-vitamin D (CALCIUM-VITAMIN D) 500 mg(1,250mg) -200 unit per tablet [CALCIUM-VITAMIN D (CALCIUM-VITAMIN D) 500 MG(1,250MG) -200 UNIT PER TABLET] Take 1 tablet by mouth daily.       EPINEPHrine (ANY BX GENERIC EQUIV) 0.3 MG/0.3ML injection 2-pack as needed        Lactobacillus rhamnosus GG (CULTURELLE) 10-15 Billion cell capsule [LACTOBACILLUS RHAMNOSUS GG (CULTURELLE) 10-15 BILLION CELL CAPSULE] Take 1 capsule by mouth daily.       metoprolol tartrate (LOPRESSOR) 50 MG tablet TAKE 1 TABLET(50 MG) BY MOUTH TWICE DAILY 180 tablet 3     Omega-3 Fatty Acids (FISH OIL PO) Take 1 capsule by mouth daily       psyllium (METAMUCIL) 3.4 gram packet [PSYLLIUM (METAMUCIL) 3.4 GRAM PACKET] Take 1 packet by mouth daily.       VITAMIN D PO Take 5,000 Units by mouth daily         Review of Systems:     Family History  Family History   Problem Relation Age of Onset     Coronary Artery Disease Mother      Diabetes Mother      Atrial fibrillation Mother      Atrial fibrillation Father      Prostate Cancer Father      Sleep Apnea Brother      Obesity Brother      Diabetes Maternal Grandfather      Pacemaker Paternal Grandfather      No Known Problems Half-Sister      No Known Problems Half-Sister      No Known Problems Half-Brother        Social History   reports that he has been smoking cigarettes. He has been smoking an average of 1 pack per day. He has never used smokeless tobacco. He reports current alcohol use. He reports that he does not currently use drugs.    Objective:   Vital signs in last 24 hours:  /64 (BP Location: Right arm, Patient Position: Sitting, Cuff Size: Adult Regular)   Pulse 84   Resp 14   Wt 86.6 kg (191 lb)   BMI 25.90 kg/m      Physical Exam:  General: The patient is alert oriented to person place and situation.  The patient  is in no acute distress at the time of my evaluation.  Eyes: Pupils are equal, round, and reactive to light.  Conjunctiva and sclera are clear.  ENT: Oral mucosa is moist and without redness. No evident nasal discharge.  Pulmonary: Lungs are clear bilaterally with no rales, rhonchi, or wheezes.    Cardiovascular exam: Rhythm is regular. S1 and S2 are normal. No significant murmur is present. JVP is normal. Lower extremities demonstrate no significant edema. Distal pulses are intact bilaterally.  Abdomen is soft, nontender, no organomegaly, and bowel sounds present.  Musculoskeletal: Spine is straight. Extremities without deformity.  Neuro: Gait is normal.   Skin is warm, dry, and otherwise intact.      Cardiographics:   [facility] dated 9/14/2022  Mobile cardiac telemetry monitoring from 9/14/2022 to 9/27/2022 (monitored duration 12d 9h 8m).  Predominant underlying rhythm was sinus rhythm, 52 to 120bpm, average 80bpm.  No tachyarrhythmias.  No atrial fibrillation.  There were no pauses noted.  Rare supraventricular ectopic beats (<1%).  Rare premature ventricular contractions (1%).  No symptoms recorded.      Lab Results:         Outside record review:      Thank you for allowing me to participate in the care of your patient.      Sincerely,     Akbar Ocampo MD     [facility]  cc:   PRAMOD Hernández CNP  [address]  [place] [address]

## 2022-11-16 NOTE — PROGRESS NOTES
[facility]  Cardiac Electrophysiology     Progress Note: Akbar Ocampo MD    Primary Care: Dionisio Doherty MD    Primary Cardiologist: Patient will be referred to general cardiology    Primary Electrophysiologist: Akbar Ocampo MD    Assessment:         Paroxysmal atrial fibrillation: Patient is status post ablation of atrial fibrillation 7/20/2022 (PVI + RA-CTI line).  Patient currently reports no symptomatic recurrence of atrial fibrillation or flutter.  Cardiac event monitor from September 2022 demonstrated no atrial fibrillation or flutter.  The patient has a low [id number] score and is currently on low-dose aspirin therapy.     Recommendations:    No change in current medications    Patient will be scheduled for follow-up to establish care with general cardiology in 6 months and follow-up in the atrial fibrillation clinic with the EP [facility] in 12 months.    Time spent: 30 minutes spent on the date of the encounter doing chart review, history and exam, documentation and further activities as noted above.    Subjective:  Vishal Cat (57 year old male) returns to the arrhythmia clinic for interval reevaluation of his rhythm status post ablation.  The patient reports that his recovery was uneventful, and that he had no urgent or emergent hospital or office visits.  The patient has had no recurrent symptoms suggesting return of atrial fibrillation or flutter.  He reports no exertional dyspnea, exertional chest discomfort, orthopnea, PND, or ankle edema.  He has resumed all activities.  He switched off oral anticoagulant therapy to aspirin therapy approximately 6 weeks following his ablation procedure on the advice of the arrhythmia clinic [facility].  The patient has no other changes in his general health.    Current Outpatient Medications   Medication Sig Dispense Refill     aspirin (ASA) 81 MG EC tablet Take 1 tablet (81 mg) by mouth daily 90 tablet 3     calcium-vitamin D (CALCIUM-VITAMIN D) 500  mg(1,250mg) -200 unit per tablet [CALCIUM-VITAMIN D (CALCIUM-VITAMIN D) 500 MG(1,250MG) -200 UNIT PER TABLET] Take 1 tablet by mouth daily.       EPINEPHrine (ANY BX GENERIC EQUIV) 0.3 MG/0.3ML injection 2-pack as needed        Lactobacillus rhamnosus GG (CULTURELLE) 10-15 Billion cell capsule [LACTOBACILLUS RHAMNOSUS GG (CULTURELLE) 10-15 BILLION CELL CAPSULE] Take 1 capsule by mouth daily.       metoprolol tartrate (LOPRESSOR) 50 MG tablet TAKE 1 TABLET(50 MG) BY MOUTH TWICE DAILY 180 tablet 3     Omega-3 Fatty Acids (FISH OIL PO) Take 1 capsule by mouth daily       psyllium (METAMUCIL) 3.4 gram packet [PSYLLIUM (METAMUCIL) 3.4 GRAM PACKET] Take 1 packet by mouth daily.       VITAMIN D PO Take 5,000 Units by mouth daily         Review of Systems:     Family History  Family History   Problem Relation Age of Onset     Coronary Artery Disease Mother      Diabetes Mother      Atrial fibrillation Mother      Atrial fibrillation Father      Prostate Cancer Father      Sleep Apnea Brother      Obesity Brother      Diabetes Maternal Grandfather      Pacemaker Paternal Grandfather      No Known Problems Half-Sister      No Known Problems Half-Sister      No Known Problems Half-Brother        Social History   reports that he has been smoking cigarettes. He has been smoking an average of 1 pack per day. He has never used smokeless tobacco. He reports current alcohol use. He reports that he does not currently use drugs.    Objective:   Vital signs in last 24 hours:  /64 (BP Location: Right arm, Patient Position: Sitting, Cuff Size: Adult Regular)   Pulse 84   Resp 14   Wt 86.6 kg (191 lb)   BMI 25.90 kg/m      Physical Exam:  General: The patient is alert oriented to person place and situation.  The patient is in no acute distress at the time of my evaluation.  Eyes: Pupils are equal, round, and reactive to light.  Conjunctiva and sclera are clear.  ENT: Oral mucosa is moist and without redness. No evident nasal  discharge.  Pulmonary: Lungs are clear bilaterally with no rales, rhonchi, or wheezes.    Cardiovascular exam: Rhythm is regular. S1 and S2 are normal. No significant murmur is present. JVP is normal. Lower extremities demonstrate no significant edema. Distal pulses are intact bilaterally.  Abdomen is soft, nontender, no organomegaly, and bowel sounds present.  Musculoskeletal: Spine is straight. Extremities without deformity.  Neuro: Gait is normal.   Skin is warm, dry, and otherwise intact.      Cardiographics:   [facility] dated 9/14/2022  Mobile cardiac telemetry monitoring from 9/14/2022 to 9/27/2022 (monitored duration 12d 9h 8m).  Predominant underlying rhythm was sinus rhythm, 52 to 120bpm, average 80bpm.  No tachyarrhythmias.  No atrial fibrillation.  There were no pauses noted.  Rare supraventricular ectopic beats (<1%).  Rare premature ventricular contractions (1%).  No symptoms recorded.      Lab Results:         Outside record review:

## 2022-11-20 ENCOUNTER — HEALTH MAINTENANCE LETTER (OUTPATIENT)
Age: 57
End: 2022-11-20

## 2023-06-01 ENCOUNTER — HEALTH MAINTENANCE LETTER (OUTPATIENT)
Age: 58
End: 2023-06-01

## 2023-08-22 ENCOUNTER — OFFICE VISIT (OUTPATIENT)
Dept: CARDIOLOGY | Facility: CLINIC | Age: 58
End: 2023-08-22
Attending: INTERNAL MEDICINE
Payer: COMMERCIAL

## 2023-08-22 VITALS
DIASTOLIC BLOOD PRESSURE: 79 MMHG | RESPIRATION RATE: 16 BRPM | BODY MASS INDEX: 24.82 KG/M2 | HEART RATE: 70 BPM | OXYGEN SATURATION: 98 % | WEIGHT: 183 LBS | SYSTOLIC BLOOD PRESSURE: 120 MMHG

## 2023-08-22 DIAGNOSIS — Z98.890 STATUS POST CATHETER ABLATION OF ATRIAL FIBRILLATION: ICD-10-CM

## 2023-08-22 DIAGNOSIS — I48.0 PAROXYSMAL ATRIAL FIBRILLATION (H): Primary | ICD-10-CM

## 2023-08-22 PROCEDURE — 99214 OFFICE O/P EST MOD 30 MIN: CPT | Performed by: NURSE PRACTITIONER

## 2023-08-22 RX ORDER — METOPROLOL TARTRATE 50 MG
TABLET ORAL
Qty: 180 TABLET | Refills: 3
Start: 2023-08-22 | End: 2023-11-02

## 2023-08-22 NOTE — PATIENT INSTRUCTIONS
Vishaldipti Cat,    It was a pleasure to see you today at the [facility].     My recommendations after this visit include:  -Decrease metoprolol to 1 tablet daily x7 days, then stop  -Follow up with Dr. Murray as scheduled    Please do not hesitate to call with additional questions or concerns.     Mary Ellen Diehl, CNP  Clinical Cardiac Electrophysiology  [facility]  Clinic and [phone]  Fax: [phone]  Electrophysiology Nurses: [phone]

## 2023-08-22 NOTE — LETTER
8/22/2023    Dionisio Doherty MD  [facility] [address]  [facility] [address]    RE: Vishal MADAN Coxas       Dear Colleague,     I had the pleasure of seeing Vishal Cat in the [facility].     [facility]  Cardiac Electrophysiology  [address]  [place] [address]   Office: [phone]  Fax: [phone]     HEART CARE ELECTROPHYSIOLOGY FOLLOW UP    Primary Care: Dionisio Doherty MD    Assessment/Recommendations     Paroxysmal atrial fibrillation, typical atrial flutter: status post catheter ablation 7/20/2022 including PVI and CTI line. Unremarkable recovery without symptomatology or documentation of recurrent arrhythmia  -Discontinue metoprolol - historically blood pressure well controlled off antihypertensives  -Continue interval monitoring via Kardia device    [id number] score of 0  -Continue aspirin 81 mg daily    Follow up: with general cardiology 6 months     History of Present Illness/Subjective    Vishal Cat is a 57 year old male with past medical history significant for paroxysmal AF. He is seen today for follow-up status post catheter ablation 7/20/2022 including PVI and CTI line.  Prior to ablation he had intermittent and self-limiting episodes, symptomatic with fatigue.  He notes no recurrent symptoms of arrhythmia following ablation.  Typical sinus rates in the 70s by Kardia device, which he uses occasionally.  He denies chest discomfort, palpitations, dyspnea, pedal edema, syncope.       Data Review     Arrhythmia hx:   Dx/date: Paroxysmal AF 2009.  Managed on scheduled and as needed metoprolol.  Gradual increase in frequency and duration over the next 10 years.  Unremarkable TTE and sleep study negative for sleep apnea.  Started on sotalol March 2022.  Status post CA July 2022 as below.   Sx: General malaise, fatigue  Prior AAD, AV sindy blocking agents: Sotalol (refractory symptoms)  Procedures  DCCV: NA  Ablation: 7/20/2022,  Dr. Ocampo-PVI, CTI ablation; normal LA voltage    TTE: 2/28/2022  The left ventricle is normal in size.  The visual ejection fraction is 65-70%.  Normal left ventricular wall motion  The right ventricle is normal in size and function.  The right ventricle is not well visualized.  IVC diameter and respiratory changes fall into an intermediate range  suggesting an RA pressure of 8 mmHg.  The rhythm was atrial flutter.  Rapid ventricular response    MCOT, DENIA:     MCOT x12 days beginning 9/14/2022.  Predominant underlying rhythm sinus average 80 bpm.  No tachyarrhythmias or atrial fibrillation.  No symptom triggers      I have reviewed and updated the patient's past medical history, allergy list and medication list.          Physical Examination Review of Systems   Vitals: /79 (BP Location: Right arm, Patient Position: Sitting, Cuff Size: Adult Large)   Pulse 70   Resp 16   Wt 83 kg (183 lb)   SpO2 98%   BMI 24.82 kg/m      BMI= Body mass index is 24.82 kg/m .    Wt Readings from Last 3 Encounters:   08/22/23 83 kg (183 lb)   07/17/23 84.3 kg (185 lb 14.4 oz)   11/16/22 86.6 kg (191 lb)       General   Appearance:   Alert and oriented, in no acute distress.    HEENT:  Normocephalic and atraumatic. Conjunctiva and sclera are clear. Moist oral mucosa.    Neck: No JVP, carotid bruit or obvious thyromegaly.   Lungs:   Respirations unlabored. Clear bilaterally with no rales, rhonchi, or wheezes.     Cardiovascular:   Rhythm is regular. S1 and S2 are normal. No significant murmur is present. Lower extremities demonstrate no significant edema. Posterior tibial pulses are intact bilaterally.   Extremities: No cyanosis or clubbing   Skin: Skin is warm, dry, and otherwise intact.   Neurologic: Gait not assessed. Mood and affect appropriate.    A 12 point comprehensive review of systems was  negative except as noted.      Medical History  Surgical History Family History Social History   Past Medical History:    Diagnosis Date    Atrial fibrillation (H)     Snoring 12/13/2021    Sleep study recommended 12/2021    Past Surgical History:   Procedure Laterality Date    EP ABLATION FOCAL AFIB Left 7/20/2022    Procedure: Ablation Atrial Fibrilation;  Surgeon: Akbar Ocampo MD;  Location:  HEART CARDIAC CATH LAB    SIGMOIDECTOMY N/A     Family History   Problem Relation Age of Onset    Coronary Artery Disease Mother     Diabetes Mother     Atrial fibrillation Mother     Atrial fibrillation Father     Prostate Cancer Father     Sleep Apnea Brother     Obesity Brother     Diabetes Maternal Grandfather     Pacemaker Paternal Grandfather     No Known Problems Half-Sister     No Known Problems Half-Sister     No Known Problems Half-Brother     Social History     Socioeconomic History    Marital status:      Spouse name: Not on file    Number of children: Not on file    Years of education: Not on file    Highest education level: Not on file   Occupational History    Not on file   Tobacco Use    Smoking status: Every Day     Packs/day: 1.00     Types: Cigarettes    Smokeless tobacco: Never    Tobacco comments:     smoked for about as long as he can remember, mid teens   Substance and Sexual Activity    Alcohol use: Yes     Comment: few drinks nightly after dinner    Drug use: Not Currently    Sexual activity: Not on file   Other Topics Concern    Parent/sibling w/ CABG, MI or angioplasty before 65F 55M? Not Asked   Social History Narrative    Working as      Social Determinants of Health     Financial Resource Strain: Not on file   Food Insecurity: Not on file   Transportation Needs: Not on file   Physical Activity: Not on file   Stress: Not on file   Social Connections: Not on file   Intimate Partner Violence: Not on file   Housing Stability: Not on file          Medications  Allergies   Scheduled Meds:  Current Outpatient Medications   Medication Sig Dispense Refill    aspirin (ASA) 81 MG EC tablet Take 1  tablet (81 mg) by mouth daily 90 tablet 3    calcium-vitamin D (CALCIUM-VITAMIN D) 500 mg(1,250mg) -200 unit per tablet [CALCIUM-VITAMIN D (CALCIUM-VITAMIN D) 500 MG(1,250MG) -200 UNIT PER TABLET] Take 1 tablet by mouth daily.      EPINEPHrine (ANY BX GENERIC EQUIV) 0.3 MG/0.3ML injection 2-pack as needed       Lactobacillus rhamnosus GG (CULTURELLE) 10-15 Billion cell capsule [LACTOBACILLUS RHAMNOSUS GG (CULTURELLE) 10-15 BILLION CELL CAPSULE] Take 1 capsule by mouth daily.      metoprolol tartrate (LOPRESSOR) 50 MG tablet TAKE 1 TABLET(50 MG) BY MOUTH TWICE DAILY 180 tablet 3    Omega-3 Fatty Acids (FISH OIL PO) Take 1 capsule by mouth daily      psyllium (METAMUCIL) 3.4 gram packet [PSYLLIUM (METAMUCIL) 3.4 GRAM PACKET] Take 1 packet by mouth daily.      VITAMIN D PO Take 5,000 Units by mouth daily      Allergies   Allergen Reactions    Bee Venom          Lab Results    Chemistry/lipid CBC Cardiac Enzymes/BNP/TSH/INR   Lab Results   Component Value Date    BUN 9 2022     2022    CO2 28 2022    Lab Results   Component Value Date    WBC 7.1 2022    HGB 14.4 2022    HCT 45.4 2022    MCV 69 (L) 2022     2022    @RESUFAST(BMP,CBC,BNP,TSH,  INR)@      22 minutes spent reviewing prior records (including documentation, laboratory studies, cardiac testing/imaging), history and physical exam, planning, and subsequent documentation.     This note has been dictated using voice recognition software. Any grammatical, typographical, or context distortions are unintentional and inherent to the software.    Mary Ellen Diehl CNP  Clinical Cardiac Electrophysiology  [facility]  Clinic and [phone]  Fax: [phone]  Electrophysiology Nurses: [phone]          Thank you for allowing me to participate in the care of your patient.      Sincerely,     PRAMOD ADAME CNP     [facility]  Heart Care  cc:   Akbar Ocampo MD  [address]  [place] [address]

## 2023-08-22 NOTE — PROGRESS NOTES
[facility]  Cardiac Electrophysiology  [address]  [place] [address]   Office: [phone]  Fax: [phone]     HEART CARE ELECTROPHYSIOLOGY FOLLOW UP    Primary Care: Dionisio Doherty MD    Assessment/Recommendations     Paroxysmal atrial fibrillation, typical atrial flutter: status post catheter ablation 7/20/2022 including PVI and CTI line. Unremarkable recovery without symptomatology or documentation of recurrent arrhythmia  -Discontinue metoprolol - historically blood pressure well controlled off antihypertensives  -Continue interval monitoring via Kardia device    [id number] score of 0  -Continue aspirin 81 mg daily    Follow up: with general cardiology 6 months     History of Present Illness/Subjective    Vishal Cat is a 57 year old male with past medical history significant for paroxysmal AF. He is seen today for follow-up status post catheter ablation 7/20/2022 including PVI and CTI line.  Prior to ablation he had intermittent and self-limiting episodes, symptomatic with fatigue.  He notes no recurrent symptoms of arrhythmia following ablation.  Typical sinus rates in the 70s by Kardia device, which he uses occasionally.  He denies chest discomfort, palpitations, dyspnea, pedal edema, syncope.       Data Review     Arrhythmia hx:   Dx/date: Paroxysmal AF 2009.  Managed on scheduled and as needed metoprolol.  Gradual increase in frequency and duration over the next 10 years.  Unremarkable TTE and sleep study negative for sleep apnea.  Started on sotalol March 2022.  Status post CA July 2022 as below.   Sx: General malaise, fatigue  Prior AAD, AV sindy blocking agents: Sotalol (refractory symptoms)  Procedures  DCCV: NA  Ablation: 7/20/2022, Dr. Ocampo-PVI, CTI ablation; normal LA voltage    TTE: 2/28/2022  The left ventricle is normal in size.  The visual ejection fraction is 65-70%.  Normal left ventricular wall motion  The right ventricle is normal in  size and function.  The right ventricle is not well visualized.  IVC diameter and respiratory changes fall into an intermediate range  suggesting an RA pressure of 8 mmHg.  The rhythm was atrial flutter.  Rapid ventricular response    MCOT, DENIA:     MCOT x12 days beginning 9/14/2022.  Predominant underlying rhythm sinus average 80 bpm.  No tachyarrhythmias or atrial fibrillation.  No symptom triggers      I have reviewed and updated the patient's past medical history, allergy list and medication list.          Physical Examination Review of Systems   Vitals: /79 (BP Location: Right arm, Patient Position: Sitting, Cuff Size: Adult Large)   Pulse 70   Resp 16   Wt 83 kg (183 lb)   SpO2 98%   BMI 24.82 kg/m      BMI= Body mass index is 24.82 kg/m .    Wt Readings from Last 3 Encounters:   08/22/23 83 kg (183 lb)   07/17/23 84.3 kg (185 lb 14.4 oz)   11/16/22 86.6 kg (191 lb)       General   Appearance:   Alert and oriented, in no acute distress.    HEENT:  Normocephalic and atraumatic. Conjunctiva and sclera are clear. Moist oral mucosa.    Neck: No JVP, carotid bruit or obvious thyromegaly.   Lungs:   Respirations unlabored. Clear bilaterally with no rales, rhonchi, or wheezes.     Cardiovascular:   Rhythm is regular. S1 and S2 are normal. No significant murmur is present. Lower extremities demonstrate no significant edema. Posterior tibial pulses are intact bilaterally.   Extremities: No cyanosis or clubbing   Skin: Skin is warm, dry, and otherwise intact.   Neurologic: Gait not assessed. Mood and affect appropriate.    A 12 point comprehensive review of systems was  negative except as noted.      Medical History  Surgical History Family History Social History   Past Medical History:   Diagnosis Date    Atrial fibrillation (H)     Snoring 12/13/2021    Sleep study recommended 12/2021    Past Surgical History:   Procedure Laterality Date    EP ABLATION FOCAL AFIB Left 7/20/2022    Procedure: Ablation Atrial  Fibrilation;  Surgeon: Akbar Ocampo MD;  Location:  HEART CARDIAC CATH LAB    SIGMOIDECTOMY N/A     Family History   Problem Relation Age of Onset    Coronary Artery Disease Mother     Diabetes Mother     Atrial fibrillation Mother     Atrial fibrillation Father     Prostate Cancer Father     Sleep Apnea Brother     Obesity Brother     Diabetes Maternal Grandfather     Pacemaker Paternal Grandfather     No Known Problems Half-Sister     No Known Problems Half-Sister     No Known Problems Half-Brother     Social History     Socioeconomic History    Marital status:      Spouse name: Not on file    Number of children: Not on file    Years of education: Not on file    Highest education level: Not on file   Occupational History    Not on file   Tobacco Use    Smoking status: Every Day     Packs/day: 1.00     Types: Cigarettes    Smokeless tobacco: Never    Tobacco comments:     smoked for about as long as he can remember, mid teens   Substance and Sexual Activity    Alcohol use: Yes     Comment: few drinks nightly after dinner    Drug use: Not Currently    Sexual activity: Not on file   Other Topics Concern    Parent/sibling w/ CABG, MI or angioplasty before 65F 55M? Not Asked   Social History Narrative    Working as      Social Determinants of Health     Financial Resource Strain: Not on file   Food Insecurity: Not on file   Transportation Needs: Not on file   Physical Activity: Not on file   Stress: Not on file   Social Connections: Not on file   Intimate Partner Violence: Not on file   Housing Stability: Not on file          Medications  Allergies   Scheduled Meds:  Current Outpatient Medications   Medication Sig Dispense Refill    aspirin (ASA) 81 MG EC tablet Take 1 tablet (81 mg) by mouth daily 90 tablet 3    calcium-vitamin D (CALCIUM-VITAMIN D) 500 mg(1,250mg) -200 unit per tablet [CALCIUM-VITAMIN D (CALCIUM-VITAMIN D) 500 MG(1,250MG) -200 UNIT PER TABLET] Take 1 tablet by mouth daily.       EPINEPHrine (ANY BX GENERIC EQUIV) 0.3 MG/0.3ML injection 2-pack as needed       Lactobacillus rhamnosus GG (CULTURELLE) 10-15 Billion cell capsule [LACTOBACILLUS RHAMNOSUS GG (CULTURELLE) 10-15 BILLION CELL CAPSULE] Take 1 capsule by mouth daily.      metoprolol tartrate (LOPRESSOR) 50 MG tablet TAKE 1 TABLET(50 MG) BY MOUTH TWICE DAILY 180 tablet 3    Omega-3 Fatty Acids (FISH OIL PO) Take 1 capsule by mouth daily      psyllium (METAMUCIL) 3.4 gram packet [PSYLLIUM (METAMUCIL) 3.4 GRAM PACKET] Take 1 packet by mouth daily.      VITAMIN D PO Take 5,000 Units by mouth daily      Allergies   Allergen Reactions    Bee Venom          Lab Results    Chemistry/lipid CBC Cardiac Enzymes/BNP/TSH/INR   Lab Results   Component Value Date    BUN 9 2022     2022    CO2 28 2022    Lab Results   Component Value Date    WBC 7.1 2022    HGB 14.4 2022    HCT 45.4 2022    MCV 69 (L) 2022     2022    @RESUFAST(BMP,CBC,BNP,TSH,  INR)@      22 minutes spent reviewing prior records (including documentation, laboratory studies, cardiac testing/imaging), history and physical exam, planning, and subsequent documentation.     This note has been dictated using voice recognition software. Any grammatical, typographical, or context distortions are unintentional and inherent to the software.    Mary Ellen Diehl, CNP  Clinical Cardiac Electrophysiology  [facility]  Clinic and [phone]  Fax: [phone]  Electrophysiology Nurses: [phone]

## 2023-11-02 ENCOUNTER — OFFICE VISIT (OUTPATIENT)
Dept: CARDIOLOGY | Facility: CLINIC | Age: 58
End: 2023-11-02
Attending: INTERNAL MEDICINE
Payer: COMMERCIAL

## 2023-11-02 VITALS
TEMPERATURE: 98.6 F | HEART RATE: 96 BPM | SYSTOLIC BLOOD PRESSURE: 131 MMHG | RESPIRATION RATE: 14 BRPM | DIASTOLIC BLOOD PRESSURE: 87 MMHG | OXYGEN SATURATION: 99 % | BODY MASS INDEX: 25.44 KG/M2 | HEIGHT: 72 IN | WEIGHT: 187.8 LBS

## 2023-11-02 DIAGNOSIS — Z98.890 STATUS POST CATHETER ABLATION OF ATRIAL FIBRILLATION: ICD-10-CM

## 2023-11-02 DIAGNOSIS — I48.0 PAROXYSMAL ATRIAL FIBRILLATION (H): ICD-10-CM

## 2023-11-02 PROCEDURE — 99213 OFFICE O/P EST LOW 20 MIN: CPT | Performed by: INTERNAL MEDICINE

## 2023-11-02 RX ORDER — L.ACIDOPH/B.ANIMALIS/B.LONGUM 15B CELL
CAPSULE ORAL
COMMUNITY

## 2023-11-02 NOTE — PROGRESS NOTES
"[facility]  [phone]          Assessment/Recommendations   Patient with known history of paroxysmal atrial fibrillation who underwent catheter ablation and had a good result with no recurrences.  He occasionally gets a few skipped beats but it never goes into atrial fibrillation.  He has a low CHADS2 vascular score so takes an aspirin a day.  He is now off the beta-blocker.    He has had 2 stress test which were unremarkable the last of which he recalls is a couple years ago.    Encouraged him to maintain an active lifestyle with a minimum of 30 minutes of brisk walking and at least 5 times a week.  He does get a fair amount of exercise when he is traveling as he walks, carries things, sets up shows he gets a lot of steps in.    I have not change him his medications.  We will see him back in 1 year, but of course to be happy to see him sooner if questions or problems arise.           History of Present Illness/Subjective    Mr. Vishal Cat is a 58 year old male with known history of paroxysmal atrial fibrillation with recent pulmonary vein isolation procedure which was successful.  He has not had any recurrent symptoms of atrial fibrillation and feels \"excellent\".  No chest discomfort, unusual shortness of breath with activity, orthopnea, paroxysmal nocturnal dyspnea, peripheral edema, syncope or near syncopal episodes.          Physical Examination Review of Systems   /87 (BP Location: Left arm, Patient Position: Sitting, Cuff Size: Adult Regular)   Pulse 96   Temp 98.6  F (37  C) (Oral)   Resp 14   Ht 1.829 m (6')   Wt 85.2 kg (187 lb 12.8 oz)   SpO2 99%   BMI 25.47 kg/m    Body mass index is 25.47 kg/m .  Wt Readings from Last 3 Encounters:   11/02/23 85.2 kg (187 lb 12.8 oz)   08/22/23 83 kg (183 lb)   07/17/23 84.3 kg (185 lb 14.4 oz)     General Appearance:   Alert, cooperative and in no acute distress.   ENT/Mouth: Pink/moist oral mucosa   EYES:  no scleral icterus, " normal conjunctivae   Neck: JVP normal. No Hepatojugular reflux. Thyroid not visualized.   Chest/Lungs:   Lungs are clear to auscultation, equal chest wall expansion.   Cardiovascular:   S1, S2 without murmur ,clicks or rubs. Brachial, radial and posterior tibial pulses are intact and symetric. No carotid bruits noted   Abdomen:  Nontender. BS+. No bruits.   Extremities: No cyanosis, clubbing or edema   Skin: no xanthelasma, warm.    Neurologic: normal arm movement bilateral, no tremors     Psychiatric: Appropriate affect.      Enc Vitals  BP: 131/87  Pulse: 96  Resp: 14  Temp: 98.6  F (37  C)  Temp src: Oral  SpO2: 99 %  Weight: 85.2 kg (187 lb 12.8 oz)  Height: 182.9 cm (6')                                           Medical History  Surgical History Family History Social History   Past Medical History:   Diagnosis Date    Atrial fibrillation (H)     Snoring 12/13/2021    Sleep study recommended 12/2021    Past Surgical History:   Procedure Laterality Date    EP ABLATION FOCAL AFIB Left 7/20/2022    Procedure: Ablation Atrial Fibrilation;  Surgeon: Akbar Ocampo MD;  Location:  HEART CARDIAC CATH LAB    SIGMOIDECTOMY N/A     Family History   Problem Relation Age of Onset    Coronary Artery Disease Mother     Diabetes Mother     Atrial fibrillation Mother     Atrial fibrillation Father     Prostate Cancer Father     Sleep Apnea Brother     Obesity Brother     Diabetes Maternal Grandfather     Pacemaker Paternal Grandfather     No Known Problems Half-Sister     No Known Problems Half-Sister     No Known Problems Half-Brother     Social History     Socioeconomic History    Marital status:      Spouse name: Not on file    Number of children: Not on file    Years of education: Not on file    Highest education level: Not on file   Occupational History    Not on file   Tobacco Use    Smoking status: Every Day     Packs/day: 1     Types: Cigarettes    Smokeless tobacco: Never    Tobacco comments:     smoked for  about as long as he can remember, mid teens   Vaping Use    Vaping Use: Never used   Substance and Sexual Activity    Alcohol use: Yes     Comment: few drinks nightly after dinner    Drug use: Not Currently    Sexual activity: Not on file   Other Topics Concern    Parent/sibling w/ CABG, MI or angioplasty before 65F 55M? Not Asked   Social History Narrative    Working as      Social Determinants of Health     Financial Resource Strain: Not on file   Food Insecurity: Not on file   Transportation Needs: Not on file   Physical Activity: Not on file   Stress: Not on file   Social Connections: Not on file   Interpersonal Safety: Not on file   Housing Stability: Not on file          Medications  Allergies   Current Outpatient Medications   Medication Sig Dispense Refill    aspirin (ASA) 81 MG EC tablet Take 1 tablet (81 mg) by mouth daily 90 tablet 3    calcium-vitamin D (CALCIUM-VITAMIN D) 500 mg(1,250mg) -200 unit per tablet [CALCIUM-VITAMIN D (CALCIUM-VITAMIN D) 500 MG(1,250MG) -200 UNIT PER TABLET] Take 1 tablet by mouth daily.      EPINEPHrine (ANY BX GENERIC EQUIV) 0.3 MG/0.3ML injection 2-pack as needed       Lactobacillus rhamnosus GG (CULTURELLE) 10-15 Billion cell capsule [LACTOBACILLUS RHAMNOSUS GG (CULTURELLE) 10-15 BILLION CELL CAPSULE] Take 1 capsule by mouth daily.      Omega-3 Fatty Acids (FISH OIL PO) Take 1 capsule by mouth daily      Probiotic Product (FLORAJEN DIGESTION) CAPS       psyllium (METAMUCIL) 3.4 gram packet [PSYLLIUM (METAMUCIL) 3.4 GRAM PACKET] Take 1 packet by mouth daily.      VITAMIN D PO Take 5,000 Units by mouth daily      Allergies   Allergen Reactions    Bee Venom          Lab Results    Chemistry/lipid CBC Cardiac Enzymes/BNP/TSH/INR   Lab Results   Component Value Date    BUN 9 07/20/2022     07/20/2022    CO2 28 07/20/2022    Lab Results   Component Value Date    WBC 7.1 07/20/2022    HGB 14.4 07/20/2022    HCT 45.4 07/20/2022    MCV 69 (L) 07/20/2022    PLT  "173 07/20/2022    No results found for: \"CKTOTAL\", \"CKMB\", \"TROPONINI\", \"BNP\", \"TSH\", \"INR\"                                         "

## 2023-11-02 NOTE — LETTER
"11/2/2023    Dionisio Doherty MD  [facility] [address]  [facility] [address]    RE: Vishal Cat       Dear Colleague,     I had the pleasure of seeing Vishal Cat in the [facility].      [facility]  [phone]          Assessment/Recommendations   Patient with known history of paroxysmal atrial fibrillation who underwent catheter ablation and had a good result with no recurrences.  He occasionally gets a few skipped beats but it never goes into atrial fibrillation.  He has a low CHADS2 vascular score so takes an aspirin a day.  He is now off the beta-blocker.    He has had 2 stress test which were unremarkable the last of which he recalls is a couple years ago.    Encouraged him to maintain an active lifestyle with a minimum of 30 minutes of brisk walking and at least 5 times a week.  He does get a fair amount of exercise when he is traveling as he walks, carries things, sets up shows he gets a lot of steps in.    I have not change him his medications.  We will see him back in 1 year, but of course to be happy to see him sooner if questions or problems arise.           History of Present Illness/Subjective    Mr. Vishal Cat is a 58 year old male with known history of paroxysmal atrial fibrillation with recent pulmonary vein isolation procedure which was successful.  He has not had any recurrent symptoms of atrial fibrillation and feels \"excellent\".  No chest discomfort, unusual shortness of breath with activity, orthopnea, paroxysmal nocturnal dyspnea, peripheral edema, syncope or near syncopal episodes.          Physical Examination Review of Systems   /87 (BP Location: Left arm, Patient Position: Sitting, Cuff Size: Adult Regular)   Pulse 96   Temp 98.6  F (37  C) (Oral)   Resp 14   Ht 1.829 m (6')   Wt 85.2 kg (187 lb 12.8 oz)   SpO2 99%   BMI 25.47 kg/m    Body mass index is 25.47 kg/m .  Wt Readings from Last 3 Encounters:   11/02/23 " 85.2 kg (187 lb 12.8 oz)   08/22/23 83 kg (183 lb)   07/17/23 84.3 kg (185 lb 14.4 oz)     General Appearance:   Alert, cooperative and in no acute distress.   ENT/Mouth: Pink/moist oral mucosa   EYES:  no scleral icterus, normal conjunctivae   Neck: JVP normal. No Hepatojugular reflux. Thyroid not visualized.   Chest/Lungs:   Lungs are clear to auscultation, equal chest wall expansion.   Cardiovascular:   S1, S2 without murmur ,clicks or rubs. Brachial, radial and posterior tibial pulses are intact and symetric. No carotid bruits noted   Abdomen:  Nontender. BS+. No bruits.   Extremities: No cyanosis, clubbing or edema   Skin: no xanthelasma, warm.    Neurologic: normal arm movement bilateral, no tremors     Psychiatric: Appropriate affect.      Enc Vitals  BP: 131/87  Pulse: 96  Resp: 14  Temp: 98.6  F (37  C)  Temp src: Oral  SpO2: 99 %  Weight: 85.2 kg (187 lb 12.8 oz)  Height: 182.9 cm (6')                                           Medical History  Surgical History Family History Social History   Past Medical History:   Diagnosis Date    Atrial fibrillation (H)     Snoring 12/13/2021    Sleep study recommended 12/2021    Past Surgical History:   Procedure Laterality Date    EP ABLATION FOCAL AFIB Left 7/20/2022    Procedure: Ablation Atrial Fibrilation;  Surgeon: Akbar Ocampo MD;  Location:  HEART CARDIAC CATH LAB    SIGMOIDECTOMY N/A     Family History   Problem Relation Age of Onset    Coronary Artery Disease Mother     Diabetes Mother     Atrial fibrillation Mother     Atrial fibrillation Father     Prostate Cancer Father     Sleep Apnea Brother     Obesity Brother     Diabetes Maternal Grandfather     Pacemaker Paternal Grandfather     No Known Problems Half-Sister     No Known Problems Half-Sister     No Known Problems Half-Brother     Social History     Socioeconomic History    Marital status:      Spouse name: Not on file    Number of children: Not on file    Years of education: Not on file     Highest education level: Not on file   Occupational History    Not on file   Tobacco Use    Smoking status: Every Day     Packs/day: 1     Types: Cigarettes    Smokeless tobacco: Never    Tobacco comments:     smoked for about as long as he can remember, mid teens   Vaping Use    Vaping Use: Never used   Substance and Sexual Activity    Alcohol use: Yes     Comment: few drinks nightly after dinner    Drug use: Not Currently    Sexual activity: Not on file   Other Topics Concern    Parent/sibling w/ CABG, MI or angioplasty before 65F 55M? Not Asked   Social History Narrative    Working as      Social Determinants of Health     Financial Resource Strain: Not on file   Food Insecurity: Not on file   Transportation Needs: Not on file   Physical Activity: Not on file   Stress: Not on file   Social Connections: Not on file   Interpersonal Safety: Not on file   Housing Stability: Not on file          Medications  Allergies   Current Outpatient Medications   Medication Sig Dispense Refill    aspirin (ASA) 81 MG EC tablet Take 1 tablet (81 mg) by mouth daily 90 tablet 3    calcium-vitamin D (CALCIUM-VITAMIN D) 500 mg(1,250mg) -200 unit per tablet [CALCIUM-VITAMIN D (CALCIUM-VITAMIN D) 500 MG(1,250MG) -200 UNIT PER TABLET] Take 1 tablet by mouth daily.      EPINEPHrine (ANY BX GENERIC EQUIV) 0.3 MG/0.3ML injection 2-pack as needed       Lactobacillus rhamnosus GG (CULTURELLE) 10-15 Billion cell capsule [LACTOBACILLUS RHAMNOSUS GG (CULTURELLE) 10-15 BILLION CELL CAPSULE] Take 1 capsule by mouth daily.      Omega-3 Fatty Acids (FISH OIL PO) Take 1 capsule by mouth daily      Probiotic Product (FLORAJEN DIGESTION) CAPS       psyllium (METAMUCIL) 3.4 gram packet [PSYLLIUM (METAMUCIL) 3.4 GRAM PACKET] Take 1 packet by mouth daily.      VITAMIN D PO Take 5,000 Units by mouth daily      Allergies   Allergen Reactions    Bee Venom          Lab Results    Chemistry/lipid CBC Cardiac Enzymes/BNP/TSH/INR   Lab Results  "  Component Value Date    BUN 9 07/20/2022     07/20/2022    CO2 28 07/20/2022    Lab Results   Component Value Date    WBC 7.1 07/20/2022    HGB 14.4 07/20/2022    HCT 45.4 07/20/2022    MCV 69 (L) 07/20/2022     07/20/2022    No results found for: \"CKTOTAL\", \"CKMB\", \"TROPONINI\", \"BNP\", \"TSH\", \"INR\"             Thank you for allowing me to participate in the care of your patient.      Sincerely,     Mark Anthony Murray MD     [facility]  cc:   Akbar Ocampo MD  [address]  [place] [address]      "

## 2024-06-16 ENCOUNTER — HEALTH MAINTENANCE LETTER (OUTPATIENT)
Age: 59
End: 2024-06-16

## 2024-12-12 ENCOUNTER — OFFICE VISIT (OUTPATIENT)
Dept: CARDIOLOGY | Facility: CLINIC | Age: 59
End: 2024-12-12
Payer: COMMERCIAL

## 2024-12-12 VITALS
SYSTOLIC BLOOD PRESSURE: 149 MMHG | OXYGEN SATURATION: 99 % | RESPIRATION RATE: 16 BRPM | WEIGHT: 177.9 LBS | BODY MASS INDEX: 24.09 KG/M2 | HEART RATE: 91 BPM | HEIGHT: 72 IN | TEMPERATURE: 99 F | DIASTOLIC BLOOD PRESSURE: 85 MMHG

## 2024-12-12 DIAGNOSIS — Z98.890 STATUS POST CATHETER ABLATION OF ATRIAL FIBRILLATION: ICD-10-CM

## 2024-12-12 DIAGNOSIS — I10 BENIGN ESSENTIAL HYPERTENSION: Primary | ICD-10-CM

## 2024-12-12 RX ORDER — METOPROLOL SUCCINATE 25 MG/1
25 TABLET, EXTENDED RELEASE ORAL DAILY
Qty: 90 TABLET | Refills: 3 | Status: SHIPPED | OUTPATIENT
Start: 2024-12-12

## 2024-12-12 NOTE — PROGRESS NOTES
[facility]  [phone]          Assessment/Recommendations   Patient with known history of paroxysmal atrial fibrillation, status post ablation and no known recurrence.  Blood pressure is creeping up a bit and we will start him back on a beta-blocker to see if this will control his blood pressure without side effects.  Will start metoprolol long-acting 25 mg each day.  He will call us with some blood pressures after he gets going on the medication.    Patient also continues to smoke cigarettes and we talked about discontinuation and the benefits.  Given his tobacco use, I have recommended a calcium score for him.  LDL cholesterol was recently measured at 100 but if he had a significant elevated calcium score, we may be more aggressive with lipid-lowering agents as well as encouraged him even more to discontinue tobacco abuse.    Have also asked him to increase his exercise which she will take under advisement.    He does have 3 or 4 alcoholic beverages a night and we did talk about the possibility that cutting back a bit on alcohol could help his blood pressure as well.  He will also take that under advisement.    The longitudinal plan of care for the diagnosis(es)/condition(s) as documented were addressed during this visit. Due to the added complexity in care, I will continue to support Vishal in the subsequent management and with ongoing continuity of care.     Thank you for allowing us to participate in his care.       History of Present Illness/Subjective    Mr. Vishal Cat is a 59 year old male with known history of atrial fibrillation status post ablation.  A-fib has been stable.  He gets occasional premature beats and sometimes feel like his heart is running a little faster and typically will be in the 90s or even up to 100.  He has not had any chest discomfort, unusual shortness of breath, syncope, near syncope but does have vertigo.  He has not had orthopnea, paroxysmal nocturnal  dyspnea.  He continues to smoke cigarettes.  He has noticed that his blood pressure has been creeping up and today's reading is typical.         Physical Examination Review of Systems   BP (!) 149/85 (BP Location: Left arm, Patient Position: Sitting, Cuff Size: Adult Regular)   Pulse 91   Temp 99  F (37.2  C) (Oral)   Resp 16   Ht 1.829 m (6')   Wt 80.7 kg (177 lb 14.4 oz)   SpO2 99%   BMI 24.13 kg/m    Body mass index is 24.13 kg/m .  Wt Readings from Last 3 Encounters:   12/12/24 80.7 kg (177 lb 14.4 oz)   11/02/23 85.2 kg (187 lb 12.8 oz)   08/22/23 83 kg (183 lb)     General Appearance:   Alert, cooperative and in no acute distress.   ENT/Mouth: Pink/moist oral mucosa   EYES:  no scleral icterus, normal conjunctivae   Neck: JVP normal. No Hepatojugular reflux. Thyroid not visualized.   Chest/Lungs:   Lungs are clear to auscultation, equal chest wall expansion.   Cardiovascular:   S1, S2 without murmur ,clicks or rubs. Brachial, radial and posterior tibial pulses are intact and symetric. No carotid bruits noted   Abdomen:  Nontender.    Extremities: No cyanosis, clubbing or edema   Skin: no xanthelasma, warm.    Neurologic: normal arm movement bilateral, no tremors     Psychiatric: Appropriate affect.      Encounter Vitals  BP: (!) 149/85  Pulse: 91  Resp: 16  Temp: 99  F (37.2  C)  Temp src: Oral  SpO2: 99 %  Weight: 80.7 kg (177 lb 14.4 oz)  Height: 182.9 cm (6')                                           Medical History  Surgical History Family History Social History   Past Medical History:   Diagnosis Date    Atrial fibrillation (H)     Snoring 12/13/2021    Sleep study recommended 12/2021    Past Surgical History:   Procedure Laterality Date    EP ABLATION FOCAL AFIB Left 7/20/2022    Procedure: Ablation Atrial Fibrilation;  Surgeon: Akbar Ocampo MD;  Location:  HEART CARDIAC CATH LAB    SIGMOIDECTOMY N/A     Family History   Problem Relation Age of Onset    Coronary Artery Disease Mother      Diabetes Mother     Atrial fibrillation Mother     Atrial fibrillation Father     Prostate Cancer Father     Sleep Apnea Brother     Obesity Brother     Diabetes Maternal Grandfather     Pacemaker Paternal Grandfather     No Known Problems Half-Sister     No Known Problems Half-Sister     No Known Problems Half-Brother     Social History     Socioeconomic History    Marital status:      Spouse name: Not on file    Number of children: Not on file    Years of education: Not on file    Highest education level: Not on file   Occupational History    Not on file   Tobacco Use    Smoking status: Every Day     Current packs/day: 1.00     Types: Cigarettes    Smokeless tobacco: Never    Tobacco comments:     smoked for about as long as he can remember, mid teens   Vaping Use    Vaping status: Never Used   Substance and Sexual Activity    Alcohol use: Yes     Comment: few drinks nightly after dinner    Drug use: Not Currently    Sexual activity: Not on file   Other Topics Concern    Parent/sibling w/ CABG, MI or angioplasty before 65F 55M? Not Asked   Social History Narrative    Working as      Social Drivers of Health     Financial Resource Strain: Not on file   Food Insecurity: Not on file   Transportation Needs: Not on file   Physical Activity: Not on file   Stress: Not on file   Social Connections: Not on file   Interpersonal Safety: Not on file   Housing Stability: Not on file          Medications  Allergies   Current Outpatient Medications   Medication Sig Dispense Refill    aspirin (ASA) 81 MG EC tablet Take 1 tablet (81 mg) by mouth daily 90 tablet 3    calcium-vitamin D (CALCIUM-VITAMIN D) 500 mg(1,250mg) -200 unit per tablet [CALCIUM-VITAMIN D (CALCIUM-VITAMIN D) 500 MG(1,250MG) -200 UNIT PER TABLET] Take 1 tablet by mouth daily.      EPINEPHrine (ANY BX GENERIC EQUIV) 0.3 MG/0.3ML injection 2-pack as needed       Lactobacillus rhamnosus GG (CULTURELLE) 10-15 Billion cell capsule [LACTOBACILLUS  "RHAMNOSUS GG (CULTURELLE) 10-15 BILLION CELL CAPSULE] Take 1 capsule by mouth daily.      metoprolol succinate ER (TOPROL XL) 25 MG 24 hr tablet Take 1 tablet (25 mg) by mouth daily. 90 tablet 3    Omega-3 Fatty Acids (FISH OIL PO) Take 1 capsule by mouth daily      Probiotic Product (FLORAJEN DIGESTION) CAPS       psyllium (METAMUCIL) 3.4 gram packet [PSYLLIUM (METAMUCIL) 3.4 GRAM PACKET] Take 1 packet by mouth daily.      VITAMIN D PO Take 5,000 Units by mouth daily      Allergies   Allergen Reactions    Bee Venom          Lab Results    Chemistry/lipid CBC Cardiac Enzymes/BNP/TSH/INR   Lab Results   Component Value Date    BUN 9 07/20/2022     07/20/2022    CO2 28 07/20/2022    Lab Results   Component Value Date    WBC 7.1 07/20/2022    HGB 14.4 07/20/2022    HCT 45.4 07/20/2022    MCV 69 (L) 07/20/2022     07/20/2022    No results found for: \"CKTOTAL\", \"CKMB\", \"TROPONINI\", \"BNP\", \"TSH\", \"INR\"                                         "

## 2024-12-12 NOTE — LETTER
12/12/2024    Dionisio Doherty MD  [facility] [address]  [facility] [address]    RE: Vishal Cat       Dear Colleague,     I had the pleasure of seeing Vishal Cat in the [facility].      [facility]  [phone]          Assessment/Recommendations   Patient with known history of paroxysmal atrial fibrillation, status post ablation and no known recurrence.  Blood pressure is creeping up a bit and we will start him back on a beta-blocker to see if this will control his blood pressure without side effects.  Will start metoprolol long-acting 25 mg each day.  He will call us with some blood pressures after he gets going on the medication.    Patient also continues to smoke cigarettes and we talked about discontinuation and the benefits.  Given his tobacco use, I have recommended a calcium score for him.  LDL cholesterol was recently measured at 100 but if he had a significant elevated calcium score, we may be more aggressive with lipid-lowering agents as well as encouraged him even more to discontinue tobacco abuse.    Have also asked him to increase his exercise which she will take under advisement.    He does have 3 or 4 alcoholic beverages a night and we did talk about the possibility that cutting back a bit on alcohol could help his blood pressure as well.  He will also take that under advisement.    The longitudinal plan of care for the diagnosis(es)/condition(s) as documented were addressed during this visit. Due to the added complexity in care, I will continue to support Vishal in the subsequent management and with ongoing continuity of care.     Thank you for allowing us to participate in his care.       History of Present Illness/Subjective    Mr. Vishal Cat is a 59 year old male with known history of atrial fibrillation status post ablation.  A-fib has been stable.  He gets occasional premature beats and sometimes feel like his heart is running a  little faster and typically will be in the 90s or even up to 100.  He has not had any chest discomfort, unusual shortness of breath, syncope, near syncope but does have vertigo.  He has not had orthopnea, paroxysmal nocturnal dyspnea.  He continues to smoke cigarettes.  He has noticed that his blood pressure has been creeping up and today's reading is typical.         Physical Examination Review of Systems   BP (!) 149/85 (BP Location: Left arm, Patient Position: Sitting, Cuff Size: Adult Regular)   Pulse 91   Temp 99  F (37.2  C) (Oral)   Resp 16   Ht 1.829 m (6')   Wt 80.7 kg (177 lb 14.4 oz)   SpO2 99%   BMI 24.13 kg/m    Body mass index is 24.13 kg/m .  Wt Readings from Last 3 Encounters:   12/12/24 80.7 kg (177 lb 14.4 oz)   11/02/23 85.2 kg (187 lb 12.8 oz)   08/22/23 83 kg (183 lb)     General Appearance:   Alert, cooperative and in no acute distress.   ENT/Mouth: Pink/moist oral mucosa   EYES:  no scleral icterus, normal conjunctivae   Neck: JVP normal. No Hepatojugular reflux. Thyroid not visualized.   Chest/Lungs:   Lungs are clear to auscultation, equal chest wall expansion.   Cardiovascular:   S1, S2 without murmur ,clicks or rubs. Brachial, radial and posterior tibial pulses are intact and symetric. No carotid bruits noted   Abdomen:  Nontender.    Extremities: No cyanosis, clubbing or edema   Skin: no xanthelasma, warm.    Neurologic: normal arm movement bilateral, no tremors     Psychiatric: Appropriate affect.      Encounter Vitals  BP: (!) 149/85  Pulse: 91  Resp: 16  Temp: 99  F (37.2  C)  Temp src: Oral  SpO2: 99 %  Weight: 80.7 kg (177 lb 14.4 oz)  Height: 182.9 cm (6')                                           Medical History  Surgical History Family History Social History   Past Medical History:   Diagnosis Date     Atrial fibrillation (H)      Snoring 12/13/2021    Sleep study recommended 12/2021    Past Surgical History:   Procedure Laterality Date     EP ABLATION FOCAL AFIB Left  7/20/2022    Procedure: Ablation Atrial Fibrilation;  Surgeon: Akbar Ocampo MD;  Location:  HEART CARDIAC CATH LAB     SIGMOIDECTOMY N/A     Family History   Problem Relation Age of Onset     Coronary Artery Disease Mother      Diabetes Mother      Atrial fibrillation Mother      Atrial fibrillation Father      Prostate Cancer Father      Sleep Apnea Brother      Obesity Brother      Diabetes Maternal Grandfather      Pacemaker Paternal Grandfather      No Known Problems Half-Sister      No Known Problems Half-Sister      No Known Problems Half-Brother     Social History     Socioeconomic History     Marital status:      Spouse name: Not on file     Number of children: Not on file     Years of education: Not on file     Highest education level: Not on file   Occupational History     Not on file   Tobacco Use     Smoking status: Every Day     Current packs/day: 1.00     Types: Cigarettes     Smokeless tobacco: Never     Tobacco comments:     smoked for about as long as he can remember, mid teens   Vaping Use     Vaping status: Never Used   Substance and Sexual Activity     Alcohol use: Yes     Comment: few drinks nightly after dinner     Drug use: Not Currently     Sexual activity: Not on file   Other Topics Concern     Parent/sibling w/ CABG, MI or angioplasty before 65F 55M? Not Asked   Social History Narrative    Working as      Social Drivers of Health     Financial Resource Strain: Not on file   Food Insecurity: Not on file   Transportation Needs: Not on file   Physical Activity: Not on file   Stress: Not on file   Social Connections: Not on file   Interpersonal Safety: Not on file   Housing Stability: Not on file          Medications  Allergies   Current Outpatient Medications   Medication Sig Dispense Refill     aspirin (ASA) 81 MG EC tablet Take 1 tablet (81 mg) by mouth daily 90 tablet 3     calcium-vitamin D (CALCIUM-VITAMIN D) 500 mg(1,250mg) -200 unit per tablet [CALCIUM-VITAMIN D  "(CALCIUM-VITAMIN D) 500 MG(1,250MG) -200 UNIT PER TABLET] Take 1 tablet by mouth daily.       EPINEPHrine (ANY BX GENERIC EQUIV) 0.3 MG/0.3ML injection 2-pack as needed        Lactobacillus rhamnosus GG (CULTURELLE) 10-15 Billion cell capsule [LACTOBACILLUS RHAMNOSUS GG (CULTURELLE) 10-15 BILLION CELL CAPSULE] Take 1 capsule by mouth daily.       metoprolol succinate ER (TOPROL XL) 25 MG 24 hr tablet Take 1 tablet (25 mg) by mouth daily. 90 tablet 3     Omega-3 Fatty Acids (FISH OIL PO) Take 1 capsule by mouth daily       Probiotic Product (FLORAJEN DIGESTION) CAPS        psyllium (METAMUCIL) 3.4 gram packet [PSYLLIUM (METAMUCIL) 3.4 GRAM PACKET] Take 1 packet by mouth daily.       VITAMIN D PO Take 5,000 Units by mouth daily      Allergies   Allergen Reactions     Bee Venom          Lab Results    Chemistry/lipid CBC Cardiac Enzymes/BNP/TSH/INR   Lab Results   Component Value Date    BUN 9 07/20/2022     07/20/2022    CO2 28 07/20/2022    Lab Results   Component Value Date    WBC 7.1 07/20/2022    HGB 14.4 07/20/2022    HCT 45.4 07/20/2022    MCV 69 (L) 07/20/2022     07/20/2022    No results found for: \"CKTOTAL\", \"CKMB\", \"TROPONINI\", \"BNP\", \"TSH\", \"INR\"                                             Thank you for allowing me to participate in the care of your patient.      Sincerely,     Mark Anthony Murray MD     [facility]  cc:   Mark Anthony Murray MD  [address]  [place] [address]      "

## 2025-01-03 ENCOUNTER — MYC MEDICAL ADVICE (OUTPATIENT)
Dept: CARDIOLOGY | Facility: CLINIC | Age: 60
End: 2025-01-03
Payer: COMMERCIAL

## 2025-01-07 NOTE — TELEPHONE ENCOUNTER
From: Mark Anthony Murray MD  Sent: 1/6/2025   7:11 AM CST  To: Piper Victor M  Subject: FW: Blood Pressure Results                       Blood pressure is a little bit better.  Did he check heart rates after starting metoprolol as well?  If heart rates above 65, would increase metoprolol to 50 mg a day and send some more blood pressures.    Thanks,    Mark Anthony

## 2025-01-28 ENCOUNTER — HOSPITAL ENCOUNTER (OUTPATIENT)
Dept: CARDIOLOGY | Facility: CLINIC | Age: 60
Discharge: HOME | End: 2025-01-28
Attending: INTERNAL MEDICINE
Payer: COMMERCIAL

## 2025-01-28 DIAGNOSIS — R93.1 ELEVATED CORONARY ARTERY CALCIUM SCORE: ICD-10-CM

## 2025-01-28 DIAGNOSIS — I10 BENIGN ESSENTIAL HYPERTENSION: ICD-10-CM

## 2025-01-28 LAB
CV STRESS CURRENT BP HE: NORMAL
CV STRESS CURRENT HR HE: 101
CV STRESS CURRENT HR HE: 102
CV STRESS CURRENT HR HE: 103
CV STRESS CURRENT HR HE: 103
CV STRESS CURRENT HR HE: 104
CV STRESS CURRENT HR HE: 105
CV STRESS CURRENT HR HE: 106
CV STRESS CURRENT HR HE: 107
CV STRESS CURRENT HR HE: 114
CV STRESS CURRENT HR HE: 116
CV STRESS CURRENT HR HE: 119
CV STRESS CURRENT HR HE: 120
CV STRESS CURRENT HR HE: 124
CV STRESS CURRENT HR HE: 139
CV STRESS CURRENT HR HE: 141
CV STRESS CURRENT HR HE: 141
CV STRESS CURRENT HR HE: 145
CV STRESS CURRENT HR HE: 149
CV STRESS CURRENT HR HE: 149
CV STRESS CURRENT HR HE: 150
CV STRESS CURRENT HR HE: 157
CV STRESS CURRENT HR HE: 170
CV STRESS CURRENT HR HE: 170
CV STRESS CURRENT HR HE: 171
CV STRESS CURRENT HR HE: 93
CV STRESS CURRENT HR HE: 96
CV STRESS DEVIATION TIME HE: NORMAL
CV STRESS ECHO PERCENT HR HE: NORMAL
CV STRESS EXERCISE STAGE HE: NORMAL
CV STRESS EXERCISE STAGE REACHED HE: NORMAL
CV STRESS FINAL RESTING BP HE: NORMAL
CV STRESS FINAL RESTING HR HE: 106
CV STRESS MAX HR HE: 174
CV STRESS MAX TREADMILL GRADE HE: 16
CV STRESS MAX TREADMILL SPEED HE: 4.2
CV STRESS PEAK DIA BP HE: NORMAL
CV STRESS PEAK SYS BP HE: NORMAL
CV STRESS PHASE HE: NORMAL
CV STRESS PROTOCOL HE: NORMAL
CV STRESS REASON STOPPED HE: NORMAL
CV STRESS RESTING PT POSITION HE: NORMAL
CV STRESS RESTING PT POSITION HE: NORMAL
CV STRESS ST DEVIATION AMOUNT HE: NORMAL
CV STRESS ST DEVIATION ELEVATION HE: NORMAL
CV STRESS ST EVELATION AMOUNT HE: NORMAL
CV STRESS SYMPTOMS HE: NORMAL
CV STRESS TEST TYPE HE: NORMAL
CV STRESS TOTAL STAGE TIME MIN 1 HE: NORMAL
STRESS ECHO BASELINE DIASTOLIC HE: 87
STRESS ECHO BASELINE HR: NORMAL
STRESS ECHO BASELINE SYSTOLIC BP: 134
STRESS ECHO LAST STRESS DIASTOLIC BP: 78
STRESS ECHO LAST STRESS HR: 170
STRESS ECHO LAST STRESS SYSTOLIC BP: 182
STRESS ECHO POST ESTIMATED WORKLOAD: 12.1
STRESS ECHO POST EXERCISE DUR MIN: 10
STRESS ECHO POST EXERCISE DUR SEC: 0
STRESS ECHO TARGET HR: 139

## 2025-01-28 PROCEDURE — 93350 STRESS TTE ONLY: CPT | Mod: TC

## 2025-01-28 PROCEDURE — 93350 STRESS TTE ONLY: CPT | Mod: 26 | Performed by: INTERNAL MEDICINE

## 2025-01-28 PROCEDURE — 93018 CV STRESS TEST I&R ONLY: CPT | Performed by: INTERNAL MEDICINE

## 2025-01-28 PROCEDURE — 93321 DOPPLER ECHO F-UP/LMTD STD: CPT | Mod: 26 | Performed by: INTERNAL MEDICINE

## 2025-01-28 PROCEDURE — 93325 DOPPLER ECHO COLOR FLOW MAPG: CPT | Mod: 26 | Performed by: INTERNAL MEDICINE

## 2025-01-28 PROCEDURE — 93325 DOPPLER ECHO COLOR FLOW MAPG: CPT | Mod: TC

## 2025-01-28 PROCEDURE — 93016 CV STRESS TEST SUPVJ ONLY: CPT | Performed by: INTERNAL MEDICINE

## 2025-02-08 ENCOUNTER — MYC MEDICAL ADVICE (OUTPATIENT)
Dept: CARDIOLOGY | Facility: CLINIC | Age: 60
End: 2025-02-08
Payer: COMMERCIAL

## 2025-02-10 NOTE — TELEPHONE ENCOUNTER
From: Mark Anthony Murray MD  Sent: 2/10/2025   7:25 AM CST  To: Piper Victor M  Subject: FW: Blood Pressure Tracking                      Piper,    Could try taking metoprolol at bedtime to see if morning blood pressures are better.  She keep us posted on how his blood pressures are doing.    Thanks,    Mark Anthony

## 2025-04-14 ENCOUNTER — MYC MEDICAL ADVICE (OUTPATIENT)
Dept: CARDIOLOGY | Facility: CLINIC | Age: 60
End: 2025-04-14
Payer: COMMERCIAL

## 2025-06-21 ENCOUNTER — HEALTH MAINTENANCE LETTER (OUTPATIENT)
Age: 60
End: 2025-06-21

## (undated) DEVICE — VAMP PLUS SYSTEM RESERVOIR WITH 60IN PATIENT TUBING

## (undated) DEVICE — MANIFOLD 4 GANG 504BN-R

## (undated) DEVICE — CATH UMBILICAL COAX CBL 203CXC

## (undated) DEVICE — SHEATH STEERABLE FLEXCATH ADV 12F 4FC12

## (undated) DEVICE — SET FLUID DELIVERY 108IN H965913000091

## (undated) DEVICE — GUIDEWIRE VASC 0.035INX150CM INQWIRE J TIP IQ35F150J3F/A

## (undated) DEVICE — CATH EP 120CM 6FR RSPN 2-5-2MM 401261

## (undated) DEVICE — INTRO TERUMO 8FRX25CM W/MARKER RSB803

## (undated) DEVICE — CATH EP 7FR X 115CM DECANAV CA

## (undated) DEVICE — INTRO MICRO MINI STICK 4FR STIFF NITINOL 45-753

## (undated) DEVICE — CATH TRANSSEPTAL VERSACROSS 45D 63X230CM VXSK0032

## (undated) DEVICE — PATCH CARTO 3 EXTERNAL REFERENCE 3D MAPPING CREFP6

## (undated) DEVICE — CABLE BIPOLAR PACING THRESHOLD 8 WHITE

## (undated) DEVICE — CATH SOUNDSTAR 8FRX90CM 10439011

## (undated) DEVICE — CABLE UMBILICAL CRYOABLATION ELEC

## (undated) DEVICE — BAG DRAINAGE W/SPIKE MDD200H

## (undated) DEVICE — CATH NAV PENTARAY F CURVE

## (undated) DEVICE — INTRO SHEATH TERUMO 10FRX25CM PINNACLE RSS006

## (undated) DEVICE — WIRE GUIDE 0.035"X260CM AMPTLAZ XSTIFF CVD THSCF-35-260-3-A

## (undated) DEVICE — CATH ARCTIC FRONT ADVANCE 2AF284

## (undated) DEVICE — CATH THERMOCOOL SMARTTOUCH SF DF CURVE

## (undated) DEVICE — Device

## (undated) DEVICE — TUBE SET SMARKABLATE IRRIGATION

## (undated) DEVICE — PACK EP SRG PROC LF DISP SAN32EPFSR

## (undated) DEVICE — KIT LG BORE TOUHY ACCESS PLUS MAP152

## (undated) DEVICE — SYR CONTROL CORONARY 10ML CCS800

## (undated) RX ORDER — HEPARIN SODIUM 200 [USP'U]/100ML
INJECTION, SOLUTION INTRAVENOUS
Status: DISPENSED
Start: 2022-07-20

## (undated) RX ORDER — FENTANYL CITRATE 50 UG/ML
INJECTION, SOLUTION INTRAMUSCULAR; INTRAVENOUS
Status: DISPENSED
Start: 2022-07-20

## (undated) RX ORDER — HEPARIN SODIUM 1000 [USP'U]/ML
INJECTION, SOLUTION INTRAVENOUS; SUBCUTANEOUS
Status: DISPENSED
Start: 2022-07-20

## (undated) RX ORDER — HEPARIN SODIUM 10000 [USP'U]/100ML
INJECTION, SOLUTION INTRAVENOUS
Status: DISPENSED
Start: 2022-07-20

## (undated) RX ORDER — LIDOCAINE HYDROCHLORIDE 10 MG/ML
INJECTION, SOLUTION EPIDURAL; INFILTRATION; INTRACAUDAL; PERINEURAL
Status: DISPENSED
Start: 2022-07-20

## (undated) RX ORDER — ACETAMINOPHEN 325 MG/1
TABLET ORAL
Status: DISPENSED
Start: 2022-07-20